# Patient Record
Sex: FEMALE
[De-identification: names, ages, dates, MRNs, and addresses within clinical notes are randomized per-mention and may not be internally consistent; named-entity substitution may affect disease eponyms.]

---

## 2021-08-06 ENCOUNTER — TRANSCRIPTION ENCOUNTER (OUTPATIENT)
Age: 56
End: 2021-08-06

## 2021-08-23 ENCOUNTER — INPATIENT (INPATIENT)
Facility: HOSPITAL | Age: 56
LOS: 2 days | Discharge: HOME | DRG: 378 | End: 2021-08-26
Attending: HOSPITALIST | Admitting: HOSPITALIST
Payer: COMMERCIAL

## 2021-08-23 VITALS
TEMPERATURE: 98 F | DIASTOLIC BLOOD PRESSURE: 70 MMHG | SYSTOLIC BLOOD PRESSURE: 140 MMHG | OXYGEN SATURATION: 97 % | RESPIRATION RATE: 18 BRPM | HEART RATE: 114 BPM

## 2021-08-23 DIAGNOSIS — Z90.49 ACQUIRED ABSENCE OF OTHER SPECIFIED PARTS OF DIGESTIVE TRACT: Chronic | ICD-10-CM

## 2021-08-23 DIAGNOSIS — K92.2 GASTROINTESTINAL HEMORRHAGE, UNSPECIFIED: ICD-10-CM

## 2021-08-23 DIAGNOSIS — Z29.9 ENCOUNTER FOR PROPHYLACTIC MEASURES, UNSPECIFIED: ICD-10-CM

## 2021-08-23 DIAGNOSIS — F17.200 NICOTINE DEPENDENCE, UNSPECIFIED, UNCOMPLICATED: ICD-10-CM

## 2021-08-23 DIAGNOSIS — R65.10 SYSTEMIC INFLAMMATORY RESPONSE SYNDROME (SIRS) OF NON-INFECTIOUS ORIGIN WITHOUT ACUTE ORGAN DYSFUNCTION: ICD-10-CM

## 2021-08-23 DIAGNOSIS — I10 ESSENTIAL (PRIMARY) HYPERTENSION: ICD-10-CM

## 2021-08-23 DIAGNOSIS — K21.9 GASTRO-ESOPHAGEAL REFLUX DISEASE WITHOUT ESOPHAGITIS: ICD-10-CM

## 2021-08-23 DIAGNOSIS — E11.9 TYPE 2 DIABETES MELLITUS WITHOUT COMPLICATIONS: ICD-10-CM

## 2021-08-23 DIAGNOSIS — M19.90 UNSPECIFIED OSTEOARTHRITIS, UNSPECIFIED SITE: ICD-10-CM

## 2021-08-23 LAB
ALBUMIN SERPL ELPH-MCNC: 3.6 G/DL — SIGNIFICANT CHANGE UP (ref 3.5–5)
ALP SERPL-CCNC: 66 U/L — SIGNIFICANT CHANGE UP (ref 40–120)
ALT FLD-CCNC: 24 U/L DA — SIGNIFICANT CHANGE UP (ref 10–60)
ANION GAP SERPL CALC-SCNC: 7 MMOL/L — SIGNIFICANT CHANGE UP (ref 5–17)
APTT BLD: 30.4 SEC — SIGNIFICANT CHANGE UP (ref 27.5–35.5)
AST SERPL-CCNC: 14 U/L — SIGNIFICANT CHANGE UP (ref 10–40)
BASOPHILS # BLD AUTO: 0 K/UL — SIGNIFICANT CHANGE UP (ref 0–0.2)
BASOPHILS NFR BLD AUTO: 0 % — SIGNIFICANT CHANGE UP (ref 0–2)
BILIRUB SERPL-MCNC: 0.3 MG/DL — SIGNIFICANT CHANGE UP (ref 0.2–1.2)
BUN SERPL-MCNC: 52 MG/DL — HIGH (ref 7–18)
CALCIUM SERPL-MCNC: 8.6 MG/DL — SIGNIFICANT CHANGE UP (ref 8.4–10.5)
CHLORIDE SERPL-SCNC: 102 MMOL/L — SIGNIFICANT CHANGE UP (ref 96–108)
CK SERPL-CCNC: 39 U/L — SIGNIFICANT CHANGE UP (ref 21–215)
CO2 SERPL-SCNC: 26 MMOL/L — SIGNIFICANT CHANGE UP (ref 22–31)
CREAT SERPL-MCNC: 0.84 MG/DL — SIGNIFICANT CHANGE UP (ref 0.5–1.3)
EOSINOPHIL # BLD AUTO: 0 K/UL — SIGNIFICANT CHANGE UP (ref 0–0.5)
EOSINOPHIL NFR BLD AUTO: 0 % — SIGNIFICANT CHANGE UP (ref 0–6)
GLUCOSE SERPL-MCNC: 244 MG/DL — HIGH (ref 70–99)
HCT VFR BLD CALC: 20.8 % — CRITICAL LOW (ref 34.5–45)
HGB BLD-MCNC: 7.1 G/DL — LOW (ref 11.5–15.5)
INR BLD: 1.08 RATIO — SIGNIFICANT CHANGE UP (ref 0.88–1.16)
LIDOCAIN IGE QN: 180 U/L — SIGNIFICANT CHANGE UP (ref 73–393)
LYMPHOCYTES # BLD AUTO: 14 % — SIGNIFICANT CHANGE UP (ref 13–44)
LYMPHOCYTES # BLD AUTO: 2.87 K/UL — SIGNIFICANT CHANGE UP (ref 1–3.3)
MCHC RBC-ENTMCNC: 32 PG — SIGNIFICANT CHANGE UP (ref 27–34)
MCHC RBC-ENTMCNC: 34.1 GM/DL — SIGNIFICANT CHANGE UP (ref 32–36)
MCV RBC AUTO: 93.7 FL — SIGNIFICANT CHANGE UP (ref 80–100)
MONOCYTES # BLD AUTO: 0.62 K/UL — SIGNIFICANT CHANGE UP (ref 0–0.9)
MONOCYTES NFR BLD AUTO: 3 % — SIGNIFICANT CHANGE UP (ref 2–14)
NEUTROPHILS # BLD AUTO: 16.41 K/UL — HIGH (ref 1.8–7.4)
NEUTROPHILS NFR BLD AUTO: 80 % — HIGH (ref 43–77)
OB PNL STL: POSITIVE
PLATELET # BLD AUTO: 441 K/UL — HIGH (ref 150–400)
POTASSIUM SERPL-MCNC: 3.9 MMOL/L — SIGNIFICANT CHANGE UP (ref 3.5–5.3)
POTASSIUM SERPL-SCNC: 3.9 MMOL/L — SIGNIFICANT CHANGE UP (ref 3.5–5.3)
PROT SERPL-MCNC: 6.5 G/DL — SIGNIFICANT CHANGE UP (ref 6–8.3)
PROTHROM AB SERPL-ACNC: 12.8 SEC — SIGNIFICANT CHANGE UP (ref 10.6–13.6)
RBC # BLD: 2.22 M/UL — LOW (ref 3.8–5.2)
RBC # FLD: 13 % — SIGNIFICANT CHANGE UP (ref 10.3–14.5)
SARS-COV-2 RNA SPEC QL NAA+PROBE: SIGNIFICANT CHANGE UP
SODIUM SERPL-SCNC: 135 MMOL/L — SIGNIFICANT CHANGE UP (ref 135–145)
TROPONIN I SERPL-MCNC: 0.04 NG/ML — SIGNIFICANT CHANGE UP (ref 0–0.04)
WBC # BLD: 20.51 K/UL — HIGH (ref 3.8–10.5)
WBC # FLD AUTO: 20.51 K/UL — HIGH (ref 3.8–10.5)

## 2021-08-23 PROCEDURE — 99291 CRITICAL CARE FIRST HOUR: CPT | Mod: CS

## 2021-08-23 PROCEDURE — 99223 1ST HOSP IP/OBS HIGH 75: CPT | Mod: GC

## 2021-08-23 PROCEDURE — 93010 ELECTROCARDIOGRAM REPORT: CPT

## 2021-08-23 PROCEDURE — 71045 X-RAY EXAM CHEST 1 VIEW: CPT | Mod: 26

## 2021-08-23 PROCEDURE — 74174 CTA ABD&PLVS W/CONTRAST: CPT | Mod: 26

## 2021-08-23 RX ORDER — SODIUM CHLORIDE 9 MG/ML
1000 INJECTION INTRAMUSCULAR; INTRAVENOUS; SUBCUTANEOUS ONCE
Refills: 0 | Status: COMPLETED | OUTPATIENT
Start: 2021-08-23 | End: 2021-08-23

## 2021-08-23 RX ORDER — PANTOPRAZOLE SODIUM 20 MG/1
80 TABLET, DELAYED RELEASE ORAL ONCE
Refills: 0 | Status: COMPLETED | OUTPATIENT
Start: 2021-08-23 | End: 2021-08-23

## 2021-08-23 RX ORDER — SODIUM CHLORIDE 9 MG/ML
1000 INJECTION, SOLUTION INTRAVENOUS
Refills: 0 | Status: DISCONTINUED | OUTPATIENT
Start: 2021-08-23 | End: 2021-08-24

## 2021-08-23 RX ORDER — NICOTINE POLACRILEX 2 MG
1 GUM BUCCAL DAILY
Refills: 0 | Status: DISCONTINUED | OUTPATIENT
Start: 2021-08-23 | End: 2021-08-26

## 2021-08-23 RX ORDER — PANTOPRAZOLE SODIUM 20 MG/1
8 TABLET, DELAYED RELEASE ORAL
Qty: 80 | Refills: 0 | Status: DISCONTINUED | OUTPATIENT
Start: 2021-08-23 | End: 2021-08-24

## 2021-08-23 RX ORDER — INSULIN LISPRO 100/ML
VIAL (ML) SUBCUTANEOUS EVERY 6 HOURS
Refills: 0 | Status: DISCONTINUED | OUTPATIENT
Start: 2021-08-23 | End: 2021-08-26

## 2021-08-23 RX ADMIN — SODIUM CHLORIDE 1000 MILLILITER(S): 9 INJECTION INTRAMUSCULAR; INTRAVENOUS; SUBCUTANEOUS at 16:02

## 2021-08-23 RX ADMIN — PANTOPRAZOLE SODIUM 80 MILLIGRAM(S): 20 TABLET, DELAYED RELEASE ORAL at 17:15

## 2021-08-23 RX ADMIN — PANTOPRAZOLE SODIUM 10 MG/HR: 20 TABLET, DELAYED RELEASE ORAL at 18:21

## 2021-08-23 NOTE — H&P ADULT - NSICDXPASTMEDICALHX_GEN_ALL_CORE_FT
PAST MEDICAL HISTORY:  Diabetes mellitus     GERD (gastroesophageal reflux disease)     HTN (hypertension)     OA (osteoarthritis)

## 2021-08-23 NOTE — H&P ADULT - ATTENDING COMMENTS
55 year old F with OA, DM, GERD admitted for hematemesis and melena likely 2/2 PUD. Hgb 7.1 on admission, written for 1 unit PRBC and 1L fluid bolus. Guiac positive. Had aspirin recently was also given 1 unit platelet in the ED. CT angio pending.    Vital Signs Last 24 Hrs  T(C): 37.1 (23 Aug 2021 19:32), Max: 37.1 (23 Aug 2021 17:54)  T(F): 98.7 (23 Aug 2021 19:32), Max: 98.8 (23 Aug 2021 17:54)  HR: 113 (23 Aug 2021 19:32) (113 - 118)  BP: 108/70 (23 Aug 2021 19:32) (108/70 - 157/92)  BP(mean): --  RR: 18 (23 Aug 2021 19:32) (18 - 18)  SpO2: 96% (23 Aug 2021 19:32) (96% - 99%)                          7.1    20.51 )-----------( 441      ( 23 Aug 2021 16:32 )             20.8   08-23    135  |  102  |  52<H>  ----------------------------<  244<H>  3.9   |  26  |  0.84    Ca    8.6      23 Aug 2021 16:32    TPro  6.5  /  Alb  3.6  /  TBili  0.3  /  DBili  x   /  AST  14  /  ALT  24  /  AlkPhos  66  08-23    A/P  #Upper GI bleed - patient with melena and hematemesis c/f PUD. Patient has hx of NSAID use for OA. Hgb 7.1. Getting 1 unit PRBC. Plan for protonix gtt, CBC q6hr, transfuse for Hgb < 8 for active bleeding, continue IVF, GI consulted for EGD tmrw  #SIRS (tachycardic, leukocytosis) in the setting of GI bleed  #DM continue sliding scale  #GERD

## 2021-08-23 NOTE — H&P ADULT - HISTORY OF PRESENT ILLNESS
56 yo F from home with Pmhx of HTN, DM, OA, GERD, on aspirin for 18 months, came for chief complains of hematemesis and melena. Pt states that she started noticing black stools since yesterday. So far she has 7 black bowel movements, moderate in amount, soft in consistency. Last BM 3 hours ago. She had one episode of bloody vomiting in morning. She noticed few clots , small in size, moderate amount, red- maroon in color , no food particles. PT endorses to have nausea and loss of appetite for 2 days. She has tenesmus, fatigue and weakness but denies fevers, shortness of breadth, iron pills, weight loss, colonoscopy and endoscopy in the past, spicy food intake, abdominal pain.   Pt further mentioned that she is using baking soda and also having alot of red bull drink recently.

## 2021-08-23 NOTE — ED PROVIDER NOTE - CARE PLAN
1 Principal Discharge DX:	Upper GI bleed  Secondary Diagnosis:	Anemia due to acute blood loss  Secondary Diagnosis:	Sinus tachycardia

## 2021-08-23 NOTE — H&P ADULT - PROBLEM SELECTOR PLAN 3
-SBP is 140-160  -Will hold off HTN med in the setting of GI bleed   Primary team to confirm medications in the morning. Pharmacy closed at 7pm.

## 2021-08-23 NOTE — ED PROVIDER NOTE - OBJECTIVE STATEMENT
55 y.o female with a significant history of HTN (on Aspirin) and cholecystectomy presents to the ED complaining of lightheadedness and low blood pressure yesterday. Patient also notes having 3 episodes of black stool yesterday and 1 episode of black vomiting today. Patient did not take blood pressure meds yesterday or today because she spoke with her doctor who urges her to not take any meds, due to low blood pressure, and come to the ED. Patient denies pain or any other complaints. NILODA. 55 y.o female with a significant history of HTN (on Aspirin) and cholecystectomy presents to the ED complaining of lightheadedness and low blood pressure yesterday. Patient also notes having 3 episodes of black stool yesterday and 1 episode of black vomiting today. Patient did not take blood pressure meds yesterday or today because she spoke with her doctor who urged her to not take any meds, due to low blood pressure, and come to the ED. Patient denies pain or any other complaints. NILODA.

## 2021-08-23 NOTE — ED ADULT NURSE NOTE - OBJECTIVE STATEMENT
Patient presents to ED with c/o  nausea and vomiting x 3 days patient reports blood in vomit and in urine. Denies pain, c/o weakness. denies fall

## 2021-08-23 NOTE — ED PROVIDER NOTE - CLINICAL SUMMARY MEDICAL DECISION MAKING FREE TEXT BOX
Patient with suspicious for GI bleed. Will gets labs and reassess. Patient with suspicious for GI bleed. Will gets labs and reassess.   labs reveal low h/h, black stool on rectal evaluation. noted tachycardia and ichemic EKG changes on EKG. admit for blood transfusion (active bleeding) and urgent GI consult (will call from ED)

## 2021-08-23 NOTE — H&P ADULT - NSHPPHYSICALEXAM_GEN_ALL_CORE
Vital Signs Last 24 Hrs  T(C): 37.1 (23 Aug 2021 17:54), Max: 37.1 (23 Aug 2021 17:54)  T(F): 98.8 (23 Aug 2021 17:54), Max: 98.8 (23 Aug 2021 17:54)  HR: 118 (23 Aug 2021 17:54) (113 - 118)  BP: 157/92 (23 Aug 2021 17:54) (121/72 - 157/92)  BP(mean): --  RR: 18 (23 Aug 2021 17:54) (18 - 18)  SpO2: 98% (23 Aug 2021 17:54) (97% - 99%)    GENERAL: NAD, apperas comfortable   EYES: EOMI, PERRLA,   NECK: Supple, No JVD  CHEST/LUNG: Clear to auscultation b/l  No rales, rhonchi, wheezing   HEART: Regular rate and rhythm; No murmurs, +ve S1 S2 , tachycardia +ve   ABDOMEN: Soft, mild tender in epigastric region, Nondistended; Bowel sounds hyperactive   NERVOUS SYSTEM:  Alert & Oriented X3, normal sensations and normal strength     EXTREMITIES:   No clubbing, cyanosis, or edema

## 2021-08-23 NOTE — H&P ADULT - PROBLEM SELECTOR PLAN 2
-Pt with elevated HR >100 and WBC 20K  -Less likely infection but will do sepsis work up  -No need of abx  now   -Will send UA, Bcx, Chest Xray is normal (f/u official read)  -EKG with T wave inversion in V5 and AVL but no chest pain , SOB  -Trop borderline normal

## 2021-08-23 NOTE — H&P ADULT - ASSESSMENT
54 yo F with hx of HTN, DM, OA, GERD came for chief complain fo hematemesis and melena . Admitted for upper GI bleed     Primary team to confirm medications in the morning. Pharmacy closed at 7pm. Pt and  don't remember the medications

## 2021-08-23 NOTE — H&P ADULT - PROBLEM SELECTOR PLAN 1
-c/w hematemesis and melena   -DDx: PUD vs Aspirin Use  -2 IV bore cannula   -1 lit of bolus NS in ED   -Going to get 1PRBC and 1 Plt STAT  -CT Angio A/P with IV Contrast   -Standing fluids 100cc/hr LR  -CBC q6h   -If pt continues to bleed, unstable  , likely go for urgent Endoscopy but for now EGD tomorrow  -NPO   -GI consulted Dr. Antonio -c/w hematemesis and melena   -DDx: PUD vs Aspirin Use  -2 IV bore cannula   -1 lit of bolus NS in ED   -Going to get 1PRBC and 1 Plt STAT  -CT Angio A/P with IV Contrast   -Standing fluids 100cc/hr LR  -CBC q6h   -If pt continues to bleed, unstable  , likely go for urgent Endoscopy but for now EGD tomorrow  -NPO   -Protonix drip for 48-72 hour  -GI consulted Dr. Antonio

## 2021-08-24 DIAGNOSIS — E87.6 HYPOKALEMIA: ICD-10-CM

## 2021-08-24 LAB
A1C WITH ESTIMATED AVERAGE GLUCOSE RESULT: 7.2 % — HIGH (ref 4–5.6)
ALBUMIN SERPL ELPH-MCNC: 3.5 G/DL — SIGNIFICANT CHANGE UP (ref 3.5–5)
ALP SERPL-CCNC: 61 U/L — SIGNIFICANT CHANGE UP (ref 40–120)
ALT FLD-CCNC: 23 U/L DA — SIGNIFICANT CHANGE UP (ref 10–60)
ANION GAP SERPL CALC-SCNC: 6 MMOL/L — SIGNIFICANT CHANGE UP (ref 5–17)
APPEARANCE UR: CLEAR — SIGNIFICANT CHANGE UP
AST SERPL-CCNC: 13 U/L — SIGNIFICANT CHANGE UP (ref 10–40)
BACTERIA # UR AUTO: ABNORMAL /HPF
BILIRUB SERPL-MCNC: 0.3 MG/DL — SIGNIFICANT CHANGE UP (ref 0.2–1.2)
BILIRUB UR-MCNC: NEGATIVE — SIGNIFICANT CHANGE UP
BUN SERPL-MCNC: 30 MG/DL — HIGH (ref 7–18)
CALCIUM SERPL-MCNC: 8.7 MG/DL — SIGNIFICANT CHANGE UP (ref 8.4–10.5)
CHLORIDE SERPL-SCNC: 107 MMOL/L — SIGNIFICANT CHANGE UP (ref 96–108)
CHOLEST SERPL-MCNC: 174 MG/DL — SIGNIFICANT CHANGE UP
CO2 SERPL-SCNC: 27 MMOL/L — SIGNIFICANT CHANGE UP (ref 22–31)
COLOR SPEC: YELLOW — SIGNIFICANT CHANGE UP
COVID-19 SPIKE DOMAIN AB INTERP: POSITIVE
COVID-19 SPIKE DOMAIN ANTIBODY RESULT: 31.7 U/ML — HIGH
CREAT SERPL-MCNC: 0.67 MG/DL — SIGNIFICANT CHANGE UP (ref 0.5–1.3)
DIFF PNL FLD: NEGATIVE — SIGNIFICANT CHANGE UP
EPI CELLS # UR: SIGNIFICANT CHANGE UP /HPF
ESTIMATED AVERAGE GLUCOSE: 160 MG/DL — HIGH (ref 68–114)
GLUCOSE BLDC GLUCOMTR-MCNC: 149 MG/DL — HIGH (ref 70–99)
GLUCOSE BLDC GLUCOMTR-MCNC: 171 MG/DL — HIGH (ref 70–99)
GLUCOSE BLDC GLUCOMTR-MCNC: 183 MG/DL — HIGH (ref 70–99)
GLUCOSE BLDC GLUCOMTR-MCNC: 192 MG/DL — HIGH (ref 70–99)
GLUCOSE SERPL-MCNC: 139 MG/DL — HIGH (ref 70–99)
GLUCOSE UR QL: NEGATIVE — SIGNIFICANT CHANGE UP
HCT VFR BLD CALC: 19.4 % — CRITICAL LOW (ref 34.5–45)
HCT VFR BLD CALC: 21.2 % — LOW (ref 34.5–45)
HCT VFR BLD CALC: 22.1 % — LOW (ref 34.5–45)
HCT VFR BLD CALC: 22.6 % — LOW (ref 34.5–45)
HDLC SERPL-MCNC: 30 MG/DL — LOW
HGB BLD-MCNC: 6.7 G/DL — CRITICAL LOW (ref 11.5–15.5)
HGB BLD-MCNC: 7.2 G/DL — LOW (ref 11.5–15.5)
HGB BLD-MCNC: 7.7 G/DL — LOW (ref 11.5–15.5)
HGB BLD-MCNC: 7.8 G/DL — LOW (ref 11.5–15.5)
KETONES UR-MCNC: NEGATIVE — SIGNIFICANT CHANGE UP
LEUKOCYTE ESTERASE UR-ACNC: NEGATIVE — SIGNIFICANT CHANGE UP
LIPID PNL WITH DIRECT LDL SERPL: 72 MG/DL — SIGNIFICANT CHANGE UP
MAGNESIUM SERPL-MCNC: 2.2 MG/DL — SIGNIFICANT CHANGE UP (ref 1.6–2.6)
MCHC RBC-ENTMCNC: 31.3 PG — SIGNIFICANT CHANGE UP (ref 27–34)
MCHC RBC-ENTMCNC: 31.6 PG — SIGNIFICANT CHANGE UP (ref 27–34)
MCHC RBC-ENTMCNC: 31.8 PG — SIGNIFICANT CHANGE UP (ref 27–34)
MCHC RBC-ENTMCNC: 31.8 PG — SIGNIFICANT CHANGE UP (ref 27–34)
MCHC RBC-ENTMCNC: 34 GM/DL — SIGNIFICANT CHANGE UP (ref 32–36)
MCHC RBC-ENTMCNC: 34.5 GM/DL — SIGNIFICANT CHANGE UP (ref 32–36)
MCHC RBC-ENTMCNC: 34.5 GM/DL — SIGNIFICANT CHANGE UP (ref 32–36)
MCHC RBC-ENTMCNC: 34.8 GM/DL — SIGNIFICANT CHANGE UP (ref 32–36)
MCV RBC AUTO: 91.3 FL — SIGNIFICANT CHANGE UP (ref 80–100)
MCV RBC AUTO: 91.5 FL — SIGNIFICANT CHANGE UP (ref 80–100)
MCV RBC AUTO: 91.9 FL — SIGNIFICANT CHANGE UP (ref 80–100)
MCV RBC AUTO: 92.2 FL — SIGNIFICANT CHANGE UP (ref 80–100)
NITRITE UR-MCNC: NEGATIVE — SIGNIFICANT CHANGE UP
NON HDL CHOLESTEROL: 144 MG/DL — HIGH
NRBC # BLD: 0 /100 WBCS — SIGNIFICANT CHANGE UP (ref 0–0)
PH UR: 6 — SIGNIFICANT CHANGE UP (ref 5–8)
PHOSPHATE SERPL-MCNC: 2 MG/DL — LOW (ref 2.5–4.5)
PLATELET # BLD AUTO: 383 K/UL — SIGNIFICANT CHANGE UP (ref 150–400)
PLATELET # BLD AUTO: 386 K/UL — SIGNIFICANT CHANGE UP (ref 150–400)
PLATELET # BLD AUTO: 395 K/UL — SIGNIFICANT CHANGE UP (ref 150–400)
PLATELET # BLD AUTO: 402 K/UL — HIGH (ref 150–400)
POTASSIUM SERPL-MCNC: 3.2 MMOL/L — LOW (ref 3.5–5.3)
POTASSIUM SERPL-SCNC: 3.2 MMOL/L — LOW (ref 3.5–5.3)
PROT SERPL-MCNC: 6.5 G/DL — SIGNIFICANT CHANGE UP (ref 6–8.3)
PROT UR-MCNC: 15
RBC # BLD: 2.11 M/UL — LOW (ref 3.8–5.2)
RBC # BLD: 2.3 M/UL — LOW (ref 3.8–5.2)
RBC # BLD: 2.42 M/UL — LOW (ref 3.8–5.2)
RBC # BLD: 2.47 M/UL — LOW (ref 3.8–5.2)
RBC # FLD: 13.1 % — SIGNIFICANT CHANGE UP (ref 10.3–14.5)
RBC # FLD: 13.4 % — SIGNIFICANT CHANGE UP (ref 10.3–14.5)
RBC # FLD: 13.6 % — SIGNIFICANT CHANGE UP (ref 10.3–14.5)
RBC # FLD: 13.8 % — SIGNIFICANT CHANGE UP (ref 10.3–14.5)
RBC CASTS # UR COMP ASSIST: NEGATIVE /HPF — SIGNIFICANT CHANGE UP (ref 0–2)
SARS-COV-2 IGG+IGM SERPL QL IA: 31.7 U/ML — HIGH
SARS-COV-2 IGG+IGM SERPL QL IA: POSITIVE
SODIUM SERPL-SCNC: 140 MMOL/L — SIGNIFICANT CHANGE UP (ref 135–145)
SP GR SPEC: 1.01 — SIGNIFICANT CHANGE UP (ref 1.01–1.02)
TRIGL SERPL-MCNC: 359 MG/DL — HIGH
UROBILINOGEN FLD QL: NEGATIVE — SIGNIFICANT CHANGE UP
WBC # BLD: 16.49 K/UL — HIGH (ref 3.8–10.5)
WBC # BLD: 17.3 K/UL — HIGH (ref 3.8–10.5)
WBC # BLD: 19.3 K/UL — HIGH (ref 3.8–10.5)
WBC # BLD: 24.29 K/UL — HIGH (ref 3.8–10.5)
WBC # FLD AUTO: 16.49 K/UL — HIGH (ref 3.8–10.5)
WBC # FLD AUTO: 17.3 K/UL — HIGH (ref 3.8–10.5)
WBC # FLD AUTO: 19.3 K/UL — HIGH (ref 3.8–10.5)
WBC # FLD AUTO: 24.29 K/UL — HIGH (ref 3.8–10.5)
WBC UR QL: SIGNIFICANT CHANGE UP /HPF (ref 0–5)

## 2021-08-24 PROCEDURE — 99222 1ST HOSP IP/OBS MODERATE 55: CPT | Mod: 25

## 2021-08-24 PROCEDURE — 99233 SBSQ HOSP IP/OBS HIGH 50: CPT | Mod: GC

## 2021-08-24 PROCEDURE — 43255 EGD CONTROL BLEEDING ANY: CPT

## 2021-08-24 PROCEDURE — 99232 SBSQ HOSP IP/OBS MODERATE 35: CPT | Mod: 25

## 2021-08-24 RX ORDER — SODIUM CHLORIDE 9 MG/ML
1000 INJECTION, SOLUTION INTRAVENOUS
Refills: 0 | Status: DISCONTINUED | OUTPATIENT
Start: 2021-08-24 | End: 2021-08-26

## 2021-08-24 RX ORDER — ESCITALOPRAM OXALATE 10 MG/1
10 TABLET, FILM COATED ORAL DAILY
Refills: 0 | Status: DISCONTINUED | OUTPATIENT
Start: 2021-08-24 | End: 2021-08-26

## 2021-08-24 RX ORDER — BUDESONIDE AND FORMOTEROL FUMARATE DIHYDRATE 160; 4.5 UG/1; UG/1
2 AEROSOL RESPIRATORY (INHALATION)
Refills: 0 | Status: DISCONTINUED | OUTPATIENT
Start: 2021-08-24 | End: 2021-08-26

## 2021-08-24 RX ORDER — GEMFIBROZIL 600 MG
600 TABLET ORAL
Refills: 0 | Status: DISCONTINUED | OUTPATIENT
Start: 2021-08-24 | End: 2021-08-26

## 2021-08-24 RX ORDER — POTASSIUM CHLORIDE 20 MEQ
10 PACKET (EA) ORAL
Refills: 0 | Status: COMPLETED | OUTPATIENT
Start: 2021-08-24 | End: 2021-08-24

## 2021-08-24 RX ORDER — PANTOPRAZOLE SODIUM 20 MG/1
40 TABLET, DELAYED RELEASE ORAL EVERY 12 HOURS
Refills: 0 | Status: DISCONTINUED | OUTPATIENT
Start: 2021-08-24 | End: 2021-08-26

## 2021-08-24 RX ORDER — POTASSIUM PHOSPHATE, MONOBASIC POTASSIUM PHOSPHATE, DIBASIC 236; 224 MG/ML; MG/ML
15 INJECTION, SOLUTION INTRAVENOUS ONCE
Refills: 0 | Status: COMPLETED | OUTPATIENT
Start: 2021-08-24 | End: 2021-08-24

## 2021-08-24 RX ORDER — GABAPENTIN 400 MG/1
100 CAPSULE ORAL
Refills: 0 | Status: DISCONTINUED | OUTPATIENT
Start: 2021-08-24 | End: 2021-08-26

## 2021-08-24 RX ADMIN — Medication 100 MILLIEQUIVALENT(S): at 16:00

## 2021-08-24 RX ADMIN — POTASSIUM PHOSPHATE, MONOBASIC POTASSIUM PHOSPHATE, DIBASIC 62.5 MILLIMOLE(S): 236; 224 INJECTION, SOLUTION INTRAVENOUS at 17:35

## 2021-08-24 RX ADMIN — Medication 100 MILLIEQUIVALENT(S): at 17:15

## 2021-08-24 RX ADMIN — BUDESONIDE AND FORMOTEROL FUMARATE DIHYDRATE 2 PUFF(S): 160; 4.5 AEROSOL RESPIRATORY (INHALATION) at 21:25

## 2021-08-24 RX ADMIN — PANTOPRAZOLE SODIUM 10 MG/HR: 20 TABLET, DELAYED RELEASE ORAL at 05:45

## 2021-08-24 RX ADMIN — Medication 100 MILLIEQUIVALENT(S): at 15:00

## 2021-08-24 RX ADMIN — SODIUM CHLORIDE 100 MILLILITER(S): 9 INJECTION, SOLUTION INTRAVENOUS at 05:45

## 2021-08-24 RX ADMIN — GABAPENTIN 100 MILLIGRAM(S): 400 CAPSULE ORAL at 17:35

## 2021-08-24 RX ADMIN — PANTOPRAZOLE SODIUM 40 MILLIGRAM(S): 20 TABLET, DELAYED RELEASE ORAL at 17:35

## 2021-08-24 RX ADMIN — Medication 600 MILLIGRAM(S): at 17:35

## 2021-08-24 NOTE — PROGRESS NOTE ADULT - ATTENDING COMMENTS
Patient seen and examined.  Case discussed with house staff.  Agree with above as edited.   Patient interviewed with [place] , ID # [id number]  Patient is a 55yoF with h/o HTN, DM II, OA, GERD a/w acute blood loss anemia 2/2 Upper GI bleed.  Acute blood loss anemia 2/2 Upper GI bleed - Being transfused pRBCs. Monitor h/h. GI eval appreciated. NPO. Plan for EGD today. Suspect PUD secondary to ASA use > gastritis, esophagitis, AVM. c/w PPI. Holding ASA  SIRS - likely noninfectious SIRS in setting of GI bleed. No fever. no s/s of infection. Cxs have been drawn. Will f/u results  HTN - holding home antihypertensives in setting of GI bleed. Resume as tolerated  DM - holding oral hypoglycemics. ISS with FS monitoring.  Hypokalemia - Replete with KCl and monitor.   Hypophosphatemia - Replete with KPhos and monitor.   Plan d/w Patient  Case d/w GI Attending Patient seen and examined.  Case discussed with house staff.  Agree with above as edited.   Patient interviewed with [place] , ID # [id number]  Patient is a 55yoF with h/o HTN, DM II, OA, GERD a/w acute blood loss anemia 2/2 Upper GI bleed.  Acute blood loss anemia 2/2 Upper GI bleed - Being transfused pRBCs. Monitor h/h. GI eval appreciated. NPO. Plan for EGD today. Suspect PUD secondary to ASA use > gastritis, esophagitis, AVM. c/w PPI. Holding ASA  SIRS - likely noninfectious SIRS in setting of GI bleed. No fever. no s/s of infection. Cxs have been drawn. Will f/u results  HTN - holding home antihypertensives in setting of GI bleed. Resume as tolerated  DM - holding oral hypoglycemics. ISS with FS monitoring.  Hypokalemia - Replete with KCl and monitor.   Hypophosphatemia - Replete with KPhos and monitor.   Plan d/w Patient  Case d/w GI Attending    8/24/21 15:25 UPDATE:  Spoke to patient's PMD. Patient has been on ASA for primary prophylaxis. Also, in addition to above, has h/o COPD and is on Symbicort. Will continue to hold ASA. Will resume symbicort.

## 2021-08-24 NOTE — CONSULT NOTE ADULT - ASSESSMENT
54 yo F with hx of HTN, DM, OA (reports using Advil), GERD and +tobacco user admitted for upper GI bleed

## 2021-08-24 NOTE — CONSULT NOTE ADULT - SUBJECTIVE AND OBJECTIVE BOX
[facility]    Patient is a 55y old  Female who presents with a chief complaint of Upper GI bleed (23 Aug 2021 18:05)    HPI:  56 yo F from home with Pmhx of HTN, DM, OA, GERD, on aspirin for 18 months, came for chief complains of hematemesis and melena. Pt states that she started noticing black stools since yesterday. So far she has 7 black bowel movements, moderate in amount, soft in consistency. Last BM 3 hours ago. She had one episode of bloody vomiting in morning. She noticed few clots , small in size, moderate amount, red- maroon in color , no food particles. PT endorses to have nausea and loss of appetite for 2 days. She has tenesmus, fatigue and weakness but denies fevers, shortness of breadth, iron pills, weight loss, colonoscopy and endoscopy in the past, spicy food intake, abdominal pain.   Pt further mentioned that she is using baking soda and also having alot of red bull drink recently.  (23 Aug 2021 18:05)    PMH/PSH:  PAST MEDICAL & SURGICAL HISTORY:  HTN (hypertension)    Diabetes mellitus    OA (osteoarthritis)    GERD (gastroesophageal reflux disease)    S/P cholecystectomy      FH:  FAMILY HISTORY:      MEDS:  MEDICATIONS  (STANDING):  insulin lispro (ADMELOG) corrective regimen sliding scale   SubCutaneous every 6 hours  lactated ringers. 1000 milliLiter(s) (100 mL/Hr) IV Continuous <Continuous>  nicotine -  14 mG/24Hr(s) Patch 1 patch Transdermal daily  pantoprazole Infusion 8 mG/Hr (10 mL/Hr) IV Continuous <Continuous>    MEDICATIONS  (PRN):    Allergies    No Known Allergies    Intolerances            CONSTITUTIONAL:  No weight loss, fever, chills, weakness or fatigue.  HEENT:  Eyes:  No visual loss, blurred vision, double vision or yellow sclerae. Ears, Nose, Throat:  No hearing loss, sneezing, congestion, runny nose or sore throat.  SKIN:  No rash or itching.  CARDIOVASCULAR:  No chest pain, chest pressure or chest discomfort. No palpitations or edema.  RESPIRATORY:  No shortness of breath, cough or sputum.  GASTROINTESTINAL:  SEE HPI  GENITOURINARY:  No dysuria, hematuria, urinary frequency  NEUROLOGICAL:  No headache, dizziness, syncope, paralysis, ataxia, numbness or tingling in the extremities. No change in bowel or bladder control.  MUSCULOSKELETAL:  No muscle, back pain, joint pain or stiffness.  HEMATOLOGIC:  No anemia, bleeding or bruising.  LYMPHATICS:  No enlarged nodes. No history of splenectomy.  PSYCHIATRIC:  No history of depression or anxiety.  ENDOCRINOLOGIC:  No reports of sweating, cold or heat intolerance. No polyuria or polydipsia.      ______________________________________________________________________  PHYSICAL EXAM:  T(C): 36.4 (08-24-21 @ 07:44), Max: 37.2 (08-23-21 @ 21:05)  HR: 100 (08-24-21 @ 07:44)  BP: 130/71 (08-24-21 @ 07:44)  RR: 18 (08-24-21 @ 07:44)  SpO2: 99% (08-24-21 @ 07:44)  Wt(kg): --      GEN: NAD, normocephalic  CVS: S1S2+  CHEST: clear to auscultation  ABD: soft , nontender, nondistended, bowel sounds present  EXTR: no cyanosis, no clubbing, no edema  NEURO: Awake and alert; oriented to person, place, time   SKIN:  warm;  non icteric    ______________________________________________________________________  LABS:                        6.7    19.30 )-----------( 383      ( 24 Aug 2021 06:56 )             19.4     08-24    140  |  107  |  30<H>  ----------------------------<  139<H>  3.2<L>   |  27  |  0.67    Ca    8.7      24 Aug 2021 06:56  Phos  2.0     08-24  Mg     2.2     08-24    TPro  6.5  /  Alb  3.5  /  TBili  0.3  /  DBili  x   /  AST  13  /  ALT  23  /  AlkPhos  61  08-24    LIVER FUNCTIONS - ( 24 Aug 2021 06:56 )  Alb: 3.5 g/dL / Pro: 6.5 g/dL / ALK PHOS: 61 U/L / ALT: 23 U/L DA / AST: 13 U/L / GGT: x           PT/INR - ( 23 Aug 2021 16:32 )   PT: 12.8 sec;   INR: 1.08 ratio         PTT - ( 23 Aug 2021 16:32 )  PTT:30.4 sec  ____________________________________________    IMAGING: CTA showing no signs of GI Hemorrhage          IN GI CONSULTATION    Patient is a 55y old  Female who presents with a chief complaint of Upper GI bleed (23 Aug 2021 18:05)    HPI:  54 yo F from home with Pmhx of HTN, DM, OA, GERD, on aspirin for 18 months, came for chief complains of hematemesis and melena. Pt states that she started noticing black stools since yesterday. So far she has 7 black bowel movements, moderate in amount, soft in consistency. Last BM 3 hours ago. She had one episode of bloody vomiting in morning. She noticed few clots , small in size, moderate amount, red- maroon in color , no food particles. PT endorses to have nausea and loss of appetite for 2 days. She has tenesmus, fatigue and weakness but denies fevers, shortness of breadth, iron pills, weight loss, colonoscopy and endoscopy in the past, spicy food intake, abdominal pain.   Pt further mentioned that she is using baking soda and also having alot of red bull drink recently.  (23 Aug 2021 18:05)    PMH/PSH:  PAST MEDICAL & SURGICAL HISTORY:  HTN (hypertension)    Diabetes mellitus    OA (osteoarthritis)    GERD (gastroesophageal reflux disease)    S/P cholecystectomy      FH:  FAMILY HISTORY:      MEDS:  MEDICATIONS  (STANDING):  insulin lispro (ADMELOG) corrective regimen sliding scale   SubCutaneous every 6 hours  lactated ringers. 1000 milliLiter(s) (100 mL/Hr) IV Continuous <Continuous>  nicotine -  14 mG/24Hr(s) Patch 1 patch Transdermal daily  pantoprazole Infusion 8 mG/Hr (10 mL/Hr) IV Continuous <Continuous>    MEDICATIONS  (PRN):    Allergies    No Known Allergies    Intolerances            CONSTITUTIONAL:  No weight loss, fever, chills, weakness or fatigue.  HEENT:  Eyes:  No visual loss, blurred vision, double vision or yellow sclerae. Ears, Nose, Throat:  No hearing loss, sneezing, congestion, runny nose or sore throat.  SKIN:  No rash or itching.  CARDIOVASCULAR:  No chest pain, chest pressure or chest discomfort. No palpitations or edema.  RESPIRATORY:  No shortness of breath, cough or sputum.  GASTROINTESTINAL:  SEE HPI  GENITOURINARY:  No dysuria, hematuria, urinary frequency  NEUROLOGICAL:  No headache, dizziness, syncope, paralysis, ataxia, numbness or tingling in the extremities. No change in bowel or bladder control.  MUSCULOSKELETAL:  No muscle, back pain, joint pain or stiffness.  HEMATOLOGIC:  No anemia, bleeding or bruising.  LYMPHATICS:  No enlarged nodes. No history of splenectomy.  PSYCHIATRIC:  No history of depression or anxiety.  ENDOCRINOLOGIC:  No reports of sweating, cold or heat intolerance. No polyuria or polydipsia.    GI HPI  54 yo F with hx of HTN, DM, OA (reports using Advil), GERD and +tobacco user came for chief complain for hematemesis and melena. Admitted for upper GI bleed         ______________________________________________________________________  PHYSICAL EXAM:  T(C): 36.4 (08-24-21 @ 07:44), Max: 37.2 (08-23-21 @ 21:05)  HR: 100 (08-24-21 @ 07:44)  BP: 130/71 (08-24-21 @ 07:44)  RR: 18 (08-24-21 @ 07:44)  SpO2: 99% (08-24-21 @ 07:44)  Wt(kg): --      GEN: NAD, normocephalic  CVS: S1S2+  CHEST: clear to auscultation  ABD: soft , nontender, nondistended, bowel sounds present  EXTR: no cyanosis, no clubbing, no edema  NEURO: Awake and alert; oriented to person, place, time   SKIN:  warm;  non icteric    ______________________________________________________________________  LABS:                        6.7    19.30 )-----------( 383      ( 24 Aug 2021 06:56 )             19.4     08-24    140  |  107  |  30<H>  ----------------------------<  139<H>  3.2<L>   |  27  |  0.67    Ca    8.7      24 Aug 2021 06:56  Phos  2.0     08-24  Mg     2.2     08-24    TPro  6.5  /  Alb  3.5  /  TBili  0.3  /  DBili  x   /  AST  13  /  ALT  23  /  AlkPhos  61  08-24    LIVER FUNCTIONS - ( 24 Aug 2021 06:56 )  Alb: 3.5 g/dL / Pro: 6.5 g/dL / ALK PHOS: 61 U/L / ALT: 23 U/L DA / AST: 13 U/L / GGT: x           PT/INR - ( 23 Aug 2021 16:32 )   PT: 12.8 sec;   INR: 1.08 ratio         PTT - ( 23 Aug 2021 16:32 )  PTT:30.4 sec  ____________________________________________    IMAGING: CTA showing no signs of GI Hemorrhage          [facility]    Patient is a 55y old  Female who presents with a chief complaint of Upper GI bleed (23 Aug 2021 18:05)    HPI:  56 yo F from home with Pmhx of HTN, DM, OA, GERD, on aspirin for 18 months, came for chief complains of hematemesis and melena. Pt states that she started noticing black stools since yesterday. So far she has 7 black bowel movements, moderate in amount, soft in consistency. Last BM 3 hours ago. She had one episode of bloody vomiting in morning. She noticed few clots , small in size, moderate amount, red- maroon in color , no food particles. PT endorses to have nausea and loss of appetite for 2 days. She has tenesmus, fatigue and weakness but denies fevers, shortness of breadth, iron pills, weight loss, colonoscopy and endoscopy in the past, spicy food intake, abdominal pain.   Pt further mentioned that she is using baking soda and also having alot of red bull drink recently.  (23 Aug 2021 18:05)    PMH/PSH:  PAST MEDICAL & SURGICAL HISTORY:  HTN (hypertension)    Diabetes mellitus    OA (osteoarthritis)    GERD (gastroesophageal reflux disease)    S/P cholecystectomy      FH:  FAMILY HISTORY:      MEDS:  MEDICATIONS  (STANDING):  insulin lispro (ADMELOG) corrective regimen sliding scale   SubCutaneous every 6 hours  lactated ringers. 1000 milliLiter(s) (100 mL/Hr) IV Continuous <Continuous>  nicotine -  14 mG/24Hr(s) Patch 1 patch Transdermal daily  pantoprazole Infusion 8 mG/Hr (10 mL/Hr) IV Continuous <Continuous>    MEDICATIONS  (PRN):    Allergies    No Known Allergies    Intolerances            CONSTITUTIONAL:  No weight loss, fever, chills, weakness or fatigue.  HEENT:  Eyes:  No visual loss, blurred vision, double vision or yellow sclerae. Ears, Nose, Throat:  No hearing loss, sneezing, congestion, runny nose or sore throat.  SKIN:  No rash or itching.  CARDIOVASCULAR:  No chest pain, chest pressure or chest discomfort. No palpitations or edema.  RESPIRATORY:  No shortness of breath, cough or sputum.  GASTROINTESTINAL:  SEE HPI  GENITOURINARY:  No dysuria, hematuria, urinary frequency  NEUROLOGICAL:  No headache, dizziness, syncope, paralysis, ataxia, numbness or tingling in the extremities. No change in bowel or bladder control.  MUSCULOSKELETAL:  No muscle, back pain, joint pain or stiffness.  HEMATOLOGIC:  No anemia, bleeding or bruising.  LYMPHATICS:  No enlarged nodes. No history of splenectomy.  PSYCHIATRIC:  No history of depression or anxiety.  ENDOCRINOLOGIC:  No reports of sweating, cold or heat intolerance. No polyuria or polydipsia.    GI HPI  56 yo F with hx of HTN, DM, OA (reports using Advil), GERD and +tobacco user came for chief complaint of hematemesis and melena x1 day. Patient denies ever having an EGD/Colonoscopy. Admitted for upper GI bleed         ______________________________________________________________________  PHYSICAL EXAM:  T(C): 36.4 (08-24-21 @ 07:44), Max: 37.2 (08-23-21 @ 21:05)  HR: 100 (08-24-21 @ 07:44)  BP: 130/71 (08-24-21 @ 07:44)  RR: 18 (08-24-21 @ 07:44)  SpO2: 99% (08-24-21 @ 07:44)  Wt(kg): --      GEN: NAD, normocephalic  CVS: S1S2+  CHEST: clear to auscultation  ABD: soft , nontender, nondistended, bowel sounds present  EXTR: no cyanosis, no clubbing, no edema  NEURO: Awake and alert; oriented to person, place, time   SKIN:  warm;  non icteric    ______________________________________________________________________  LABS:                        6.7    19.30 )-----------( 383      ( 24 Aug 2021 06:56 )             19.4     08-24    140  |  107  |  30<H>  ----------------------------<  139<H>  3.2<L>   |  27  |  0.67    Ca    8.7      24 Aug 2021 06:56  Phos  2.0     08-24  Mg     2.2     08-24    TPro  6.5  /  Alb  3.5  /  TBili  0.3  /  DBili  x   /  AST  13  /  ALT  23  /  AlkPhos  61  08-24    LIVER FUNCTIONS - ( 24 Aug 2021 06:56 )  Alb: 3.5 g/dL / Pro: 6.5 g/dL / ALK PHOS: 61 U/L / ALT: 23 U/L DA / AST: 13 U/L / GGT: x           PT/INR - ( 23 Aug 2021 16:32 )   PT: 12.8 sec;   INR: 1.08 ratio         PTT - ( 23 Aug 2021 16:32 )  PTT:30.4 sec  ____________________________________________    IMAGING: CTA showing no signs of GI Hemorrhage          [facility]    Patient is a 55y old  Female who presents with a chief complaint of Upper GI bleed (23 Aug 2021 18:05)    HPI:  56 yo F from home with Pmhx of HTN, DM, OA, GERD, on aspirin for 18 months, came for chief complains of hematemesis and melena. Pt states that she started noticing black stools since yesterday. So far she has 7 black bowel movements, moderate in amount, soft in consistency. Last BM 3 hours ago. She had one episode of bloody vomiting in morning. She noticed few clots , small in size, moderate amount, red- maroon in color , no food particles. PT endorses to have nausea and loss of appetite for 2 days. She has tenesmus, fatigue and weakness but denies fevers, shortness of breadth, iron pills, weight loss, colonoscopy and endoscopy in the past, spicy food intake, abdominal pain.   Pt further mentioned that she is using baking soda and also having alot of red bull drink recently.  (23 Aug 2021 18:05)    PMH/PSH:  PAST MEDICAL & SURGICAL HISTORY:  HTN (hypertension)    Diabetes mellitus    OA (osteoarthritis)    GERD (gastroesophageal reflux disease)    S/P cholecystectomy      FH:  FAMILY HISTORY:  Denies fhx of GI disorders.     MEDS:  MEDICATIONS  (STANDING):  insulin lispro (ADMELOG) corrective regimen sliding scale   SubCutaneous every 6 hours  lactated ringers. 1000 milliLiter(s) (100 mL/Hr) IV Continuous <Continuous>  nicotine -  14 mG/24Hr(s) Patch 1 patch Transdermal daily  pantoprazole Infusion 8 mG/Hr (10 mL/Hr) IV Continuous <Continuous>    MEDICATIONS  (PRN):    Allergies    No Known Allergies    Intolerances            CONSTITUTIONAL:  No weight loss, fever, chills, weakness or fatigue.  HEENT:  Eyes:  No visual loss, blurred vision, double vision or yellow sclerae. Ears, Nose, Throat:  No hearing loss, sneezing, congestion, runny nose or sore throat.  SKIN:  No rash or itching.  CARDIOVASCULAR:  No chest pain, chest pressure or chest discomfort. No palpitations or edema.  RESPIRATORY:  No shortness of breath, cough or sputum.  GASTROINTESTINAL:  SEE HPI  GENITOURINARY:  No dysuria, hematuria, urinary frequency  NEUROLOGICAL:  No headache, dizziness, syncope, paralysis, ataxia, numbness or tingling in the extremities. No change in bowel or bladder control.  MUSCULOSKELETAL:  No muscle, back pain, joint pain or stiffness.  HEMATOLOGIC:  No anemia, bleeding or bruising.  LYMPHATICS:  No enlarged nodes. No history of splenectomy.  PSYCHIATRIC:  No history of depression or anxiety.  ENDOCRINOLOGIC:  No reports of sweating, cold or heat intolerance. No polyuria or polydipsia.    GI HPI  56 yo F with hx of HTN, DM, OA (reports using Advil), GERD and +tobacco user came for chief complaint of hematemesis and melena x1 day. Patient denies ever having an EGD/Colonoscopy. Admitted for upper GI bleed         ______________________________________________________________________  PHYSICAL EXAM:  T(C): 36.4 (08-24-21 @ 07:44), Max: 37.2 (08-23-21 @ 21:05)  HR: 100 (08-24-21 @ 07:44)  BP: 130/71 (08-24-21 @ 07:44)  RR: 18 (08-24-21 @ 07:44)  SpO2: 99% (08-24-21 @ 07:44)  Wt(kg): --      GEN: NAD, normocephalic  CVS: S1S2+  CHEST: clear to auscultation  ABD: soft , nontender, nondistended, bowel sounds present  EXTR: no cyanosis, no clubbing, no edema  NEURO: Awake and alert; oriented to person, place, time   SKIN:  warm;  non icteric    ______________________________________________________________________  LABS:                        6.7    19.30 )-----------( 383      ( 24 Aug 2021 06:56 )             19.4     08-24    140  |  107  |  30<H>  ----------------------------<  139<H>  3.2<L>   |  27  |  0.67    Ca    8.7      24 Aug 2021 06:56  Phos  2.0     08-24  Mg     2.2     08-24    TPro  6.5  /  Alb  3.5  /  TBili  0.3  /  DBili  x   /  AST  13  /  ALT  23  /  AlkPhos  61  08-24    LIVER FUNCTIONS - ( 24 Aug 2021 06:56 )  Alb: 3.5 g/dL / Pro: 6.5 g/dL / ALK PHOS: 61 U/L / ALT: 23 U/L DA / AST: 13 U/L / GGT: x           PT/INR - ( 23 Aug 2021 16:32 )   PT: 12.8 sec;   INR: 1.08 ratio         PTT - ( 23 Aug 2021 16:32 )  PTT:30.4 sec  ____________________________________________    IMAGING: CTA showing no signs of GI Hemorrhage

## 2021-08-24 NOTE — PROGRESS NOTE ADULT - SUBJECTIVE AND OBJECTIVE BOX
PGY1 Progress Note discussed with attending     PAGER #: [[phone]] TILL 5:00 PM  PLEASE CONTACT ON CALL TEAM:  - On Call Team (Please refer to Jessica) FROM 5:00 PM - 8:30PM  - Nightfloat Team FROM 8:30 -7:30 AM    CHIEF COMPLAINT & BRIEF HOSPITAL COURSE:56 yo F from home with Pmhx of HTN, DM, OA, GERD, on aspirin for 18 months, came for chief complains of hematemesis and melena. Pt states that she started noticing black stools since yesterday. So far she has 7 black bowel movements, moderate in amount, soft in consistency. Last BM 3 hours ago. She had one episode of bloody vomiting in morning. She noticed few clots , small in size, moderate amount, red- maroon in color , no food particles. PT endorses to have nausea and loss of appetite for 2 days. She has tenesmus, fatigue and weakness but denies fevers, shortness of breadth, iron pills, weight loss, colonoscopy and endoscopy in the past spicy food intake, abdominal pain. In the ED patient found to have Hgb 7.1 given 1 unit PRBC and 1 unit plts. CT Angio negative     INTERVAL HPI/OVERNIGHT EVENTS:       REVIEW OF SYSTEMS:  CONSTITUTIONAL: No fever, weight loss, or fatigue  RESPIRATORY: No cough, wheezing, chills or hemoptysis; No shortness of breath  CARDIOVASCULAR: No chest pain, palpitations, dizziness, or leg swelling  GASTROINTESTINAL: No abdominal pain. No nausea, vomiting, or hematemesis; No diarrhea or constipation. No melena or hematochezia.  GENITOURINARY: No dysuria or hematuria, urinary frequency  NEUROLOGICAL: No headaches, memory loss, loss of strength, numbness, or tremors  SKIN: No itching, burning, rashes, or lesions     Vital Signs Last 24 Hrs  T(C): 36.9 (24 Aug 2021 12:40), Max: 37.2 (23 Aug 2021 21:05)  T(F): 98.4 (24 Aug 2021 12:40), Max: 99 (23 Aug 2021 21:05)  HR: 103 (24 Aug 2021 12:40) (99 - 118)  BP: 144/71 (24 Aug 2021 12:40) (108/70 - 157/92)  BP(mean): 89 (24 Aug 2021 11:16) (82 - 89)  RR: 18 (24 Aug 2021 12:40) (17 - 19)  SpO2: 99% (24 Aug 2021 11:16) (96% - 100%)    PHYSICAL EXAMINATION:  GENERAL: NAD, well built  HEAD:  Atraumatic, Normocephalic  EYES:  conjunctiva and sclera clear  NECK: Supple, No JVD, Normal thyroid  CHEST/LUNG: Clear to auscultation. Clear to percussion bilaterally; No rales, rhonchi, wheezing, or rubs  HEART: Regular rate and rhythm; No murmurs, rubs, or gallops  ABDOMEN: Soft, Nontender, Nondistended; Bowel sounds present  NERVOUS SYSTEM:  Alert & Oriented X3,    EXTREMITIES:  2+ Peripheral Pulses, No clubbing, cyanosis, or edema  SKIN: warm dry                          7.8    17.30 )-----------( 386      ( 24 Aug 2021 12:51 )             22.6     08-24    140  |  107  |  30<H>  ----------------------------<  139<H>  3.2<L>   |  27  |  0.67    Ca    8.7      24 Aug 2021 06:56  Phos  2.0     08-24  Mg     2.2     08-24    TPro  6.5  /  Alb  3.5  /  TBili  0.3  /  DBili  x   /  AST  13  /  ALT  23  /  AlkPhos  61  08-24    LIVER FUNCTIONS - ( 24 Aug 2021 06:56 )  Alb: 3.5 g/dL / Pro: 6.5 g/dL / ALK PHOS: 61 U/L / ALT: 23 U/L DA / AST: 13 U/L / GGT: x           CARDIAC MARKERS ( 23 Aug 2021 16:32 )  0.043 ng/mL / x     / 39 U/L / x     / x          PT/INR - ( 23 Aug 2021 16:32 )   PT: 12.8 sec;   INR: 1.08 ratio         PTT - ( 23 Aug 2021 16:32 )  PTT:30.4 sec    CAPILLARY BLOOD GLUCOSE      RADIOLOGY & ADDITIONAL TESTS:                   PGY1 Progress Note discussed with attending     PAGER #: [[phone]] TILL 5:00 PM  PLEASE CONTACT ON CALL TEAM:  - On Call Team (Please refer to Jessica) FROM 5:00 PM - 8:30PM  - Nightfloat Team FROM 8:30 -7:30 AM    CHIEF COMPLAINT & BRIEF HOSPITAL COURSE:54 yo F from home with Pmhx of HTN, DM, OA, GERD, on aspirin for 18 months, came for chief complains of hematemesis and melena. Pt states that she started noticing black stools since yesterday. So far she has 7 black bowel movements, moderate in amount, soft in consistency. Last BM 3 hours ago. She had one episode of bloody vomiting in morning. She noticed few clots , small in size, moderate amount, red- maroon in color , no food particles. PT endorses to have nausea and loss of appetite for 2 days. She has tenesmus, fatigue and weakness but denies fevers, shortness of breadth, iron pills, weight loss, colonoscopy and endoscopy in the past spicy food intake, abdominal pain. In the ED patient found to have Hgb 7.1 given 1 unit PRBC and 1 unit plts. CT Angio negative for acute hemorrhage, IVF and protonix drip was started. After 1 unit Hgb 7.7    INTERVAL HPI/OVERNIGHT EVENTS: [place]ian speaking female  ID #[id number] No acute events overnight, CBC shows Hgb 6.7. Patient scheduled for EGD today. Will need 2 units PRBC.       REVIEW OF SYSTEMS:  CONSTITUTIONAL: No fever, weight loss, or fatigue  RESPIRATORY: No cough, wheezing, chills or hemoptysis; No shortness of breath  CARDIOVASCULAR: No chest pain, palpitations, dizziness, or leg swelling  GASTROINTESTINAL: No abdominal pain. No nausea, vomiting, or hematemesis; No diarrhea or constipation. No melena or hematochezia.  GENITOURINARY: No dysuria or hematuria, urinary frequency  NEUROLOGICAL: No headaches, memory loss, loss of strength, numbness, or tremors  SKIN: No itching, burning, rashes, or lesions     Vital Signs Last 24 Hrs  T(C): 36.9 (24 Aug 2021 12:40), Max: 37.2 (23 Aug 2021 21:05)  T(F): 98.4 (24 Aug 2021 12:40), Max: 99 (23 Aug 2021 21:05)  HR: 103 (24 Aug 2021 12:40) (99 - 118)  BP: 144/71 (24 Aug 2021 12:40) (108/70 - 157/92)  BP(mean): 89 (24 Aug 2021 11:16) (82 - 89)  RR: 18 (24 Aug 2021 12:40) (17 - 19)  SpO2: 99% (24 Aug 2021 11:16) (96% - 100%)    PHYSICAL EXAMINATION:  GENERAL: NAD, well built  HEAD:  Atraumatic, Normocephalic  EYES:  conjunctiva and sclera clear  NECK: Supple, No JVD, Normal thyroid  CHEST/LUNG: Clear to auscultation No rales, rhonchi, wheezing, or rubs  HEART: Regular rate and rhythm; No murmurs, rubs, or gallops  ABDOMEN: Soft, Nontender, Nondistended; Bowel sounds present  NERVOUS SYSTEM:  Alert & Oriented X3,    EXTREMITIES:  2+ Peripheral Pulses, No clubbing, cyanosis, or edema  SKIN: warm dry                          7.8    17.30 )-----------( 386      ( 24 Aug 2021 12:51 )             22.6     08-24    140  |  107  |  30<H>  ----------------------------<  139<H>  3.2<L>   |  27  |  0.67    Ca    8.7      24 Aug 2021 06:56  Phos  2.0     08-24  Mg     2.2     08-24    TPro  6.5  /  Alb  3.5  /  TBili  0.3  /  DBili  x   /  AST  13  /  ALT  23  /  AlkPhos  61  08-24    LIVER FUNCTIONS - ( 24 Aug 2021 06:56 )  Alb: 3.5 g/dL / Pro: 6.5 g/dL / ALK PHOS: 61 U/L / ALT: 23 U/L DA / AST: 13 U/L / GGT: x           CARDIAC MARKERS ( 23 Aug 2021 16:32 )  0.043 ng/mL / x     / 39 U/L / x     / x          PT/INR - ( 23 Aug 2021 16:32 )   PT: 12.8 sec;   INR: 1.08 ratio         PTT - ( 23 Aug 2021 16:32 )  PTT:30.4 sec    CAPILLARY BLOOD GLUCOSE      RADIOLOGY & ADDITIONAL TESTS:                   PGY1 Progress Note discussed with attending     PAGER #: [[phone]] TILL 5:00 PM  PLEASE CONTACT ON CALL TEAM:  - On Call Team (Please refer to Jessica) FROM 5:00 PM - 8:30PM  - Nightfloat Team FROM 8:30 -7:30 AM    CHIEF COMPLAINT & BRIEF HOSPITAL COURSE:56 yo F from home with Pmhx of HTN, DM, OA, GERD, on aspirin for 18 months, came for chief complains of hematemesis and melena. Pt states that she started noticing black stools since yesterday. So far she has 7 black bowel movements, moderate in amount, soft in consistency. Last BM 3 hours ago. She had one episode of bloody vomiting in morning. She noticed few clots , small in size, moderate amount, red- maroon in color , no food particles. PT endorses to have nausea and loss of appetite for 2 days. She has tenesmus, fatigue and weakness but denies fevers, shortness of breadth, iron pills, weight loss, colonoscopy and endoscopy in the past spicy food intake, abdominal pain. In the ED patient found to have Hgb 7.1 given 1 unit PRBC and 1 unit plts. CT Angio negative for acute hemorrhage, IVF and protonix drip was started. After 1 unit Hgb 7.7    INTERVAL HPI/OVERNIGHT EVENTS: [place] speaking female  ID #[id number] No acute events overnight, CBC shows Hgb 6.7. Patient scheduled for EGD today. Will need 2 units PRBC.       REVIEW OF SYSTEMS:  CONSTITUTIONAL: No fever, weight loss, or fatigue  RESPIRATORY: No cough, wheezing, chills or hemoptysis; No shortness of breath  CARDIOVASCULAR: No chest pain, palpitations, dizziness, or leg swelling  GASTROINTESTINAL: No abdominal pain. No nausea, vomiting, or hematemesis; No diarrhea or constipation. No melena or hematochezia.  GENITOURINARY: No dysuria or hematuria, urinary frequency  NEUROLOGICAL: No headaches, memory loss, loss of strength, numbness, or tremors  SKIN: No itching, burning, rashes, or lesions     Vital Signs Last 24 Hrs  T(C): 36.9 (24 Aug 2021 12:40), Max: 37.2 (23 Aug 2021 21:05)  T(F): 98.4 (24 Aug 2021 12:40), Max: 99 (23 Aug 2021 21:05)  HR: 103 (24 Aug 2021 12:40) (99 - 118)  BP: 144/71 (24 Aug 2021 12:40) (108/70 - 157/92)  BP(mean): 89 (24 Aug 2021 11:16) (82 - 89)  RR: 18 (24 Aug 2021 12:40) (17 - 19)  SpO2: 99% (24 Aug 2021 11:16) (96% - 100%)    PHYSICAL EXAMINATION:  GENERAL: NAD, well built  HEAD:  Atraumatic, Normocephalic  EYES:  conjunctiva and sclera clear  NECK: Supple, No JVD, Normal thyroid  CHEST/LUNG: Clear to auscultation No rales, rhonchi, wheezing, or rubs  HEART: Regular rate and rhythm; No murmurs, rubs, or gallops  ABDOMEN: Soft, Nontender, Nondistended; Bowel sounds present  NERVOUS SYSTEM:  Alert & Oriented X3,    EXTREMITIES:  2+ Peripheral Pulses, No clubbing, cyanosis, or edema  SKIN: warm dry                          7.8    17.30 )-----------( 386      ( 24 Aug 2021 12:51 )             22.6     08-24    140  |  107  |  30<H>  ----------------------------<  139<H>  3.2<L>   |  27  |  0.67    Ca    8.7      24 Aug 2021 06:56  Phos  2.0     08-24  Mg     2.2     08-24    TPro  6.5  /  Alb  3.5  /  TBili  0.3  /  DBili  x   /  AST  13  /  ALT  23  /  AlkPhos  61  08-24    LIVER FUNCTIONS - ( 24 Aug 2021 06:56 )  Alb: 3.5 g/dL / Pro: 6.5 g/dL / ALK PHOS: 61 U/L / ALT: 23 U/L DA / AST: 13 U/L / GGT: x           CARDIAC MARKERS ( 23 Aug 2021 16:32 )  0.043 ng/mL / x     / 39 U/L / x     / x          PT/INR - ( 23 Aug 2021 16:32 )   PT: 12.8 sec;   INR: 1.08 ratio         PTT - ( 23 Aug 2021 16:32 )  PTT:30.4 sec

## 2021-08-24 NOTE — PROGRESS NOTE ADULT - PROBLEM SELECTOR PLAN 1
-c/w hematemesis and melena   -DDx: PUD vs Aspirin Use  -1 lit of bolus NS in ED   -Going to get 1PRBC and 1 Plt STAT  -CT Angio A/P with IV Contrast negative for acute hemorrhage   -Standing fluids 100cc/hr LR  -CBC q6h, currently Hgb 6.7   - will order 2 units PRBC, GI Dr. Antonio Hgb needs to be 8 for EGD.   -Protonix drip   -NPO -a/w hematemesis and melena   -DDx: PUD vs Aspirin Use  -1 lit of bolus NS in ED   -Going to get 1PRBC and 1 Plt STAT  -CT Angio A/P with IV Contrast negative for acute hemorrhage   -Standing fluids 100cc/hr LR  -CBC q6h, currently Hgb 6.7   - will order 2 units PRBC  -Protonix drip   -NPO

## 2021-08-24 NOTE — PROGRESS NOTE ADULT - SUBJECTIVE AND OBJECTIVE BOX
Esophagogastroduodenoscopy Report    Indication: UGIB, melena, hematemesis     Referring MD: Camden Loredo    Instrument #: [id number], [id number]    Anesthesia:  MAC    Consent:  Informed consent was obtained from the patient after providing any opportunity for questions    Procedure:  The gastroscope was gently passed through the incisoral orifice into the oral cavity and under direct visualization the esophagus was intubated. The endoscope was passed down the esophagus, through the stomach and into the second portion of the duodenum. Color, texture, mucosa and anatomy of the esophagus, stomach, and duodenum were carefully examined with the scope. The patient tolerated the procedure well. After completion of the examination, the patient was transferred to the recovery room.     Preparation:  NPO     Findings:   Oropharynx: Normal.  Esophagus: Normal.  Stomach: One large 12 mm ulcer was found in the pyloric channel with a large pulsating vessel with immediate contact bleeding. A 5 mm Coagrasper was used with soft coagulation settings to cauterize the vessel however oozing bleeding remained from its edges. One 12 mm over-the-scope clip was then deployed over the vessel/ulcer base with complete hemostasis achieved. The therapeutic endoscope was able to traverse the pylorus easily post clip deployment without stenosis.  Duodenum: Normal.     Date and time: 8/24/2021    EBL: Mild.     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 Complete hemostasis achieved using cautery and one over-the-scope clip.     Plan:    Return to hospital [facility] for ongoing care.  Clear liquid diet today.  Transition to Pantoprazole 40mg IV q12h.  Avoid NSAIDs.  Check stool H. pylori Ag, treat if positive.   Monitor for any recurring overt GI bleeding.                         Procedure Start Time: 1429  Procedure End Time: 1500          Attending: Paco Gaviria MD

## 2021-08-24 NOTE — CONSULT NOTE ADULT - ATTENDING COMMENTS
Patient seen and examined. Agree with above with following addenda:    In short, pt is a 55F presenting with melena, coffee ground emesis in setting of NSAID use w/ Hb 7.1, s/p 1U PRBC without appropriate response to 6.7. Mildly tachycardic initially but normotensive. No hx or signs of cirrhosis. Ddx includes PUD, AVMs, etc. Would continue resuscitation, goal Hb>8 for anesthesia. PPI gtt. Maintain NPO. Plan tentatively for EGD today to evaluate pending repeat CBC post transfusion. Please notify us if develops HD instability, or significant recurring overt bleed (i.e. hematemesis, further melena, etc). Patient seen and examined. Agree with above with following addenda:    In short, pt is a 55F presenting with melena, coffee ground emesis in setting of NSAID use w/ Hb 7.1, s/p 1U PRBC without appropriate response to 6.7. Mildly tachycardic initially but normotensive. No hx or signs of cirrhosis. Ddx includes PUD, AVMs, etc. Would continue resuscitation, goal Hb 7-8 for anesthesia. PPI gtt. Maintain NPO. Plan tentatively for EGD today to evaluate pending repeat CBC post transfusion. Please notify us if develops HD instability, or significant recurring overt bleed (i.e. hematemesis, further melena, etc).

## 2021-08-24 NOTE — CONSULT NOTE ADULT - PROBLEM SELECTOR RECOMMENDATION 9
hgb 6.7 s/p 1 unit PRBC's  FOBT+  plan for EGD likely today will need to transfuse to hgb 8  c/w protonix infusion  monitor CBC  NPO hgb 6.7 s/p 1 unit PRBC's  FOBT+  plan for EGD likely today, goal Hb>8.  c/w protonix infusion  monitor CBC  NPO hgb 6.7 s/p 1 unit PRBC's  FOBT+  plan for EGD likely today, goal Hb around 7-8  c/w protonix infusion  monitor CBC  NPO

## 2021-08-25 DIAGNOSIS — E83.39 OTHER DISORDERS OF PHOSPHORUS METABOLISM: ICD-10-CM

## 2021-08-25 LAB
ALBUMIN SERPL ELPH-MCNC: 3.4 G/DL — LOW (ref 3.5–5)
ALP SERPL-CCNC: 62 U/L — SIGNIFICANT CHANGE UP (ref 40–120)
ALT FLD-CCNC: 44 U/L DA — SIGNIFICANT CHANGE UP (ref 10–60)
ANION GAP SERPL CALC-SCNC: 6 MMOL/L — SIGNIFICANT CHANGE UP (ref 5–17)
AST SERPL-CCNC: 41 U/L — HIGH (ref 10–40)
BILIRUB SERPL-MCNC: 0.3 MG/DL — SIGNIFICANT CHANGE UP (ref 0.2–1.2)
BUN SERPL-MCNC: 13 MG/DL — SIGNIFICANT CHANGE UP (ref 7–18)
CALCIUM SERPL-MCNC: 8.5 MG/DL — SIGNIFICANT CHANGE UP (ref 8.4–10.5)
CHLORIDE SERPL-SCNC: 108 MMOL/L — SIGNIFICANT CHANGE UP (ref 96–108)
CO2 SERPL-SCNC: 27 MMOL/L — SIGNIFICANT CHANGE UP (ref 22–31)
CREAT SERPL-MCNC: 0.59 MG/DL — SIGNIFICANT CHANGE UP (ref 0.5–1.3)
GLUCOSE BLDC GLUCOMTR-MCNC: 156 MG/DL — HIGH (ref 70–99)
GLUCOSE BLDC GLUCOMTR-MCNC: 161 MG/DL — HIGH (ref 70–99)
GLUCOSE BLDC GLUCOMTR-MCNC: 166 MG/DL — HIGH (ref 70–99)
GLUCOSE BLDC GLUCOMTR-MCNC: 188 MG/DL — HIGH (ref 70–99)
GLUCOSE BLDC GLUCOMTR-MCNC: 199 MG/DL — HIGH (ref 70–99)
GLUCOSE SERPL-MCNC: 141 MG/DL — HIGH (ref 70–99)
HCT VFR BLD CALC: 20.7 % — CRITICAL LOW (ref 34.5–45)
HCT VFR BLD CALC: 21.4 % — LOW (ref 34.5–45)
HGB BLD-MCNC: 7.3 G/DL — LOW (ref 11.5–15.5)
HGB BLD-MCNC: 7.3 G/DL — LOW (ref 11.5–15.5)
MAGNESIUM SERPL-MCNC: 2.2 MG/DL — SIGNIFICANT CHANGE UP (ref 1.6–2.6)
MCHC RBC-ENTMCNC: 31.6 PG — SIGNIFICANT CHANGE UP (ref 27–34)
MCHC RBC-ENTMCNC: 32.4 PG — SIGNIFICANT CHANGE UP (ref 27–34)
MCHC RBC-ENTMCNC: 34.1 GM/DL — SIGNIFICANT CHANGE UP (ref 32–36)
MCHC RBC-ENTMCNC: 35.3 GM/DL — SIGNIFICANT CHANGE UP (ref 32–36)
MCV RBC AUTO: 92 FL — SIGNIFICANT CHANGE UP (ref 80–100)
MCV RBC AUTO: 92.6 FL — SIGNIFICANT CHANGE UP (ref 80–100)
NRBC # BLD: 0 /100 WBCS — SIGNIFICANT CHANGE UP (ref 0–0)
NRBC # BLD: 0 /100 WBCS — SIGNIFICANT CHANGE UP (ref 0–0)
PHOSPHATE SERPL-MCNC: 2.4 MG/DL — LOW (ref 2.5–4.5)
PLATELET # BLD AUTO: 423 K/UL — HIGH (ref 150–400)
PLATELET # BLD AUTO: 441 K/UL — HIGH (ref 150–400)
POTASSIUM SERPL-MCNC: 3.7 MMOL/L — SIGNIFICANT CHANGE UP (ref 3.5–5.3)
POTASSIUM SERPL-SCNC: 3.7 MMOL/L — SIGNIFICANT CHANGE UP (ref 3.5–5.3)
PROT SERPL-MCNC: 6.1 G/DL — SIGNIFICANT CHANGE UP (ref 6–8.3)
RBC # BLD: 2.25 M/UL — LOW (ref 3.8–5.2)
RBC # BLD: 2.31 M/UL — LOW (ref 3.8–5.2)
RBC # FLD: 13.8 % — SIGNIFICANT CHANGE UP (ref 10.3–14.5)
RBC # FLD: 13.9 % — SIGNIFICANT CHANGE UP (ref 10.3–14.5)
SODIUM SERPL-SCNC: 141 MMOL/L — SIGNIFICANT CHANGE UP (ref 135–145)
WBC # BLD: 13.43 K/UL — HIGH (ref 3.8–10.5)
WBC # BLD: 14 K/UL — HIGH (ref 3.8–10.5)
WBC # FLD AUTO: 13.43 K/UL — HIGH (ref 3.8–10.5)
WBC # FLD AUTO: 14 K/UL — HIGH (ref 3.8–10.5)

## 2021-08-25 PROCEDURE — 99232 SBSQ HOSP IP/OBS MODERATE 35: CPT

## 2021-08-25 PROCEDURE — 99233 SBSQ HOSP IP/OBS HIGH 50: CPT | Mod: GC

## 2021-08-25 RX ORDER — POTASSIUM PHOSPHATE, MONOBASIC POTASSIUM PHOSPHATE, DIBASIC 236; 224 MG/ML; MG/ML
15 INJECTION, SOLUTION INTRAVENOUS ONCE
Refills: 0 | Status: COMPLETED | OUTPATIENT
Start: 2021-08-25 | End: 2021-08-25

## 2021-08-25 RX ORDER — POTASSIUM PHOSPHATE, MONOBASIC POTASSIUM PHOSPHATE, DIBASIC 236; 224 MG/ML; MG/ML
30 INJECTION, SOLUTION INTRAVENOUS ONCE
Refills: 0 | Status: COMPLETED | OUTPATIENT
Start: 2021-08-25 | End: 2021-08-25

## 2021-08-25 RX ADMIN — POTASSIUM PHOSPHATE, MONOBASIC POTASSIUM PHOSPHATE, DIBASIC 83.33 MILLIMOLE(S): 236; 224 INJECTION, SOLUTION INTRAVENOUS at 13:54

## 2021-08-25 RX ADMIN — Medication 600 MILLIGRAM(S): at 17:04

## 2021-08-25 RX ADMIN — PANTOPRAZOLE SODIUM 40 MILLIGRAM(S): 20 TABLET, DELAYED RELEASE ORAL at 17:04

## 2021-08-25 RX ADMIN — GABAPENTIN 100 MILLIGRAM(S): 400 CAPSULE ORAL at 06:10

## 2021-08-25 RX ADMIN — Medication 600 MILLIGRAM(S): at 06:10

## 2021-08-25 RX ADMIN — Medication 1: at 11:49

## 2021-08-25 RX ADMIN — Medication 1: at 08:48

## 2021-08-25 RX ADMIN — PANTOPRAZOLE SODIUM 40 MILLIGRAM(S): 20 TABLET, DELAYED RELEASE ORAL at 06:10

## 2021-08-25 RX ADMIN — Medication 1: at 17:04

## 2021-08-25 RX ADMIN — GABAPENTIN 100 MILLIGRAM(S): 400 CAPSULE ORAL at 17:04

## 2021-08-25 NOTE — PROGRESS NOTE ADULT - ASSESSMENT
56 yo F with hx of HTN, DM, OA, GERD came for chief complain fo hematemesis and melena . Admitted for upper GI bleed 
56 yo F with hx of HTN, DM, OA, GERD came for chief complain fo hematemesis and melena . Admitted for upper GI bleed. Patient underwent EGD showing  a 12mm Pyloric channel ulcer with large pulsating vessel. Complete hemostasis was achieved using cautery and one over-the-scope clip. Current Hgb is stable at 7.3

## 2021-08-25 NOTE — PROGRESS NOTE ADULT - SUBJECTIVE AND OBJECTIVE BOX
PGY1 Progress Note discussed with attending     PAGER #: [[phone]] TILL 5:00 PM  PLEASE CONTACT ON CALL TEAM:  - On Call Team (Please refer to Jessica) FROM 5:00 PM - 8:30PM  - Nightfloat Team FROM 8:30 -7:30 AM    CHIEF COMPLAINT & BRIEF HOSPITAL COURSE:    INTERVAL HPI/OVERNIGHT EVENTS:       REVIEW OF SYSTEMS:  CONSTITUTIONAL: No fever, weight loss, or fatigue  RESPIRATORY: No cough, wheezing, chills or hemoptysis; No shortness of breath  CARDIOVASCULAR: No chest pain, palpitations, dizziness, or leg swelling  GASTROINTESTINAL: No abdominal pain. No nausea, vomiting, or hematemesis; No diarrhea or constipation. No melena or hematochezia.  GENITOURINARY: No dysuria or hematuria, urinary frequency  NEUROLOGICAL: No headaches, memory loss, loss of strength, numbness, or tremors  SKIN: No itching, burning, rashes, or lesions     Vital Signs Last 24 Hrs  T(C): 36.7 (25 Aug 2021 04:38), Max: 36.9 (24 Aug 2021 09:45)  T(F): 98 (25 Aug 2021 04:38), Max: 98.5 (24 Aug 2021 09:45)  HR: 88 (25 Aug 2021 04:38) (84 - 105)  BP: 134/69 (25 Aug 2021 04:38) (130/71 - 154/63)  BP(mean): 89 (24 Aug 2021 11:16) (82 - 89)  RR: 20 (25 Aug 2021 04:38) (18 - 20)  SpO2: 100% (25 Aug 2021 04:38) (98% - 100%)    PHYSICAL EXAMINATION:  GENERAL: NAD, well built  HEAD:  Atraumatic, Normocephalic  EYES:  conjunctiva and sclera clear  NECK: Supple, No JVD, Normal thyroid  CHEST/LUNG: Clear to auscultation. Clear to percussion bilaterally; No rales, rhonchi, wheezing, or rubs  HEART: Regular rate and rhythm; No murmurs, rubs, or gallops  ABDOMEN: Soft, Nontender, Nondistended; Bowel sounds present  NERVOUS SYSTEM:  Alert & Oriented X3,    EXTREMITIES:  2+ Peripheral Pulses, No clubbing, cyanosis, or edema  SKIN: warm dry                          7.3    14.00 )-----------( 423      ( 25 Aug 2021 00:06 )             20.7     08-24    140  |  107  |  30<H>  ----------------------------<  139<H>  3.2<L>   |  27  |  0.67    Ca    8.7      24 Aug 2021 06:56  Phos  2.0     08-24  Mg     2.2     08-24    TPro  6.5  /  Alb  3.5  /  TBili  0.3  /  DBili  x   /  AST  13  /  ALT  23  /  AlkPhos  61  08-24    LIVER FUNCTIONS - ( 24 Aug 2021 06:56 )  Alb: 3.5 g/dL / Pro: 6.5 g/dL / ALK PHOS: 61 U/L / ALT: 23 U/L DA / AST: 13 U/L / GGT: x           CARDIAC MARKERS ( 23 Aug 2021 16:32 )  0.043 ng/mL / x     / 39 U/L / x     / x          PT/INR - ( 23 Aug 2021 16:32 )   PT: 12.8 sec;   INR: 1.08 ratio         PTT - ( 23 Aug 2021 16:32 )  PTT:30.4 sec    CAPILLARY BLOOD GLUCOSE      RADIOLOGY & ADDITIONAL TESTS:                   PGY1 Progress Note discussed with attending     PAGER #: [[phone]] TILL 5:00 PM  PLEASE CONTACT ON CALL TEAM:  - On Call Team (Please refer to Jessica) FROM 5:00 PM - 8:30PM  - Nightfloat Team FROM 8:30 -7:30 AM    CHIEF COMPLAINT & BRIEF HOSPITAL COURSE:    INTERVAL HPI/OVERNIGHT EVENTS: Patient underwent EGD yesterday, Significant findings showed a 12mm Pyloric channel ulcer with large pulsating vessel. Complete hemostasis was achieved using cautery and one over-the-scope clip.  Currently patient HGB 7.3 prior Hgb was 7.3. [place]  ID # [id number], patient has no episodes of melena, emesis or hematochezia. No acute events overnight.       REVIEW OF SYSTEMS:  CONSTITUTIONAL: No fever, weight loss, or fatigue  RESPIRATORY: No cough, wheezing, chills or hemoptysis; No shortness of breath  CARDIOVASCULAR: No chest pain, palpitations, dizziness, or leg swelling  GASTROINTESTINAL: No abdominal pain. No nausea, vomiting, or hematemesis; No diarrhea or constipation. No melena or hematochezia.  GENITOURINARY: No dysuria or hematuria, urinary frequency  NEUROLOGICAL: No headaches, memory loss, loss of strength, numbness, or tremors  SKIN: No itching, burning, rashes, or lesions     Vital Signs Last 24 Hrs  T(C): 36.7 (25 Aug 2021 04:38), Max: 36.9 (24 Aug 2021 09:45)  T(F): 98 (25 Aug 2021 04:38), Max: 98.5 (24 Aug 2021 09:45)  HR: 88 (25 Aug 2021 04:38) (84 - 105)  BP: 134/69 (25 Aug 2021 04:38) (130/71 - 154/63)  BP(mean): 89 (24 Aug 2021 11:16) (82 - 89)  RR: 20 (25 Aug 2021 04:38) (18 - 20)  SpO2: 100% (25 Aug 2021 04:38) (98% - 100%)    PHYSICAL EXAMINATION:  GENERAL: NAD, well built  HEAD:  Atraumatic, Normocephalic  EYES:  conjunctiva and sclera clear  NECK: Supple, No JVD, Normal thyroid  CHEST/LUNG: Clear to auscultation. Clear to percussion bilaterally; No rales, rhonchi, wheezing, or rubs  HEART: Regular rate and rhythm; No murmurs, rubs, or gallops  ABDOMEN: Soft, Nontender, Nondistended; Bowel sounds present  NERVOUS SYSTEM:  Alert & Oriented X3,    EXTREMITIES:  2+ Peripheral Pulses, No clubbing, cyanosis, or edema  SKIN: warm dry                          7.3    14.00 )-----------( 423      ( 25 Aug 2021 00:06 )             20.7     08-24    140  |  107  |  30<H>  ----------------------------<  139<H>  3.2<L>   |  27  |  0.67    Ca    8.7      24 Aug 2021 06:56  Phos  2.0     08-24  Mg     2.2     08-24    TPro  6.5  /  Alb  3.5  /  TBili  0.3  /  DBili  x   /  AST  13  /  ALT  23  /  AlkPhos  61  08-24    LIVER FUNCTIONS - ( 24 Aug 2021 06:56 )  Alb: 3.5 g/dL / Pro: 6.5 g/dL / ALK PHOS: 61 U/L / ALT: 23 U/L DA / AST: 13 U/L / GGT: x           CARDIAC MARKERS ( 23 Aug 2021 16:32 )  0.043 ng/mL / x     / 39 U/L / x     / x          PT/INR - ( 23 Aug 2021 16:32 )   PT: 12.8 sec;   INR: 1.08 ratio         PTT - ( 23 Aug 2021 16:32 )  PTT:30.4 sec    CAPILLARY BLOOD GLUCOSE      RADIOLOGY & ADDITIONAL TESTS:                   PGY1 Progress Note discussed with attending     PAGER #: [[phone]] TILL 5:00 PM  PLEASE CONTACT ON CALL TEAM:  - On Call Team (Please refer to Jessica) FROM 5:00 PM - 8:30PM  - Nightfloat Team FROM 8:30 -7:30 AM    CHIEF COMPLAINT & BRIEF HOSPITAL COURSE:    INTERVAL HPI/OVERNIGHT EVENTS: Patient underwent EGD yesterday, Significant findings showed a 12mm Pyloric channel ulcer with large pulsating vessel. Complete hemostasis was achieved using cautery and one over-the-scope clip.  Currently patient HGB 7.3 prior Hgb was 7.3. [place]  ID # [id number], patient has no episodes of melena, emesis or hematochezia. No acute events overnight.       REVIEW OF SYSTEMS:  CONSTITUTIONAL: No fever, weight loss, or fatigue  RESPIRATORY: No cough, wheezing, chills or hemoptysis; No shortness of breath  CARDIOVASCULAR: No chest pain, palpitations, dizziness, or leg swelling  GASTROINTESTINAL: No abdominal pain. No nausea, vomiting, or hematemesis; No diarrhea or constipation. No melena or hematochezia.  GENITOURINARY: No dysuria or hematuria, urinary frequency  NEUROLOGICAL: No headaches, memory loss, loss of strength, numbness, or tremors  SKIN: No itching, burning, rashes, or lesions     Vital Signs Last 24 Hrs  T(C): 36.7 (25 Aug 2021 04:38), Max: 36.9 (24 Aug 2021 09:45)  T(F): 98 (25 Aug 2021 04:38), Max: 98.5 (24 Aug 2021 09:45)  HR: 88 (25 Aug 2021 04:38) (84 - 105)  BP: 134/69 (25 Aug 2021 04:38) (130/71 - 154/63)  BP(mean): 89 (24 Aug 2021 11:16) (82 - 89)  RR: 20 (25 Aug 2021 04:38) (18 - 20)  SpO2: 100% (25 Aug 2021 04:38) (98% - 100%)    PHYSICAL EXAMINATION:  GENERAL: NAD, well built  HEAD:  Atraumatic, Normocephalic  EYES:  conjunctiva and sclera clear  NECK: Supple, No JVD, Normal thyroid  CHEST/LUNG: Clear to auscultation. Clear to percussion bilaterally; No rales, rhonchi, wheezing, or rubs  HEART: Regular rate and rhythm; No murmurs, rubs, or gallops  ABDOMEN: Soft, Nontender, Nondistended; Bowel sounds present  NERVOUS SYSTEM:  Alert & Oriented X3,    EXTREMITIES:  2+ Peripheral Pulses, No clubbing, cyanosis, or edema  SKIN: warm dry                          7.3    14.00 )-----------( 423      ( 25 Aug 2021 00:06 )             20.7     08-24    140  |  107  |  30<H>  ----------------------------<  139<H>  3.2<L>   |  27  |  0.67    Ca    8.7      24 Aug 2021 06:56  Phos  2.0     08-24  Mg     2.2     08-24    TPro  6.5  /  Alb  3.5  /  TBili  0.3  /  DBili  x   /  AST  13  /  ALT  23  /  AlkPhos  61  08-24    LIVER FUNCTIONS - ( 24 Aug 2021 06:56 )  Alb: 3.5 g/dL / Pro: 6.5 g/dL / ALK PHOS: 61 U/L / ALT: 23 U/L DA / AST: 13 U/L / GGT: x           CARDIAC MARKERS ( 23 Aug 2021 16:32 )  0.043 ng/mL / x     / 39 U/L / x     / x          PT/INR - ( 23 Aug 2021 16:32 )   PT: 12.8 sec;   INR: 1.08 ratio         PTT - ( 23 Aug 2021 16:32 )  PTT:30.4 sec

## 2021-08-25 NOTE — PROGRESS NOTE ADULT - PROBLEM SELECTOR PLAN 5
-Mild OA  -Denies taking NSAID for pain at home  -Avoid NSAIDS
-Mild OA  -Denies taking NSAID for pain at home

## 2021-08-25 NOTE — PROGRESS NOTE ADULT - PROBLEM SELECTOR PLAN 1
PUD 2/2 Aspirin Use   - EGD significant a 12mm Pyloric channel ulcer with large pulsating vessel. Bleeding was stopped using cauterization and clip.   -Current Hgb stable at 7.3, prior 7.3 s/p total 3 units PRBCs   - Avoid NSAIDs  - Pantoprazole 40mg Q12hrs   - Monitor for bleeding  - F/U CBC    - Patient started on clear liquid diet, tolerating, tomorrow patient will be started on regular diet, if tolerating and no bleeding can be discharged

## 2021-08-25 NOTE — PROGRESS NOTE ADULT - ATTENDING COMMENTS
Patient seen and examined. Doing well post EGD yesterday with cautery and over the scope clipping of large vessel within an ulcer at the pyloric channel. Tolerating clears well. Hb downtrended initially to 7.8->7.3 which is expected given active bleeding seen during endoscopy yesterday but Hb has now stabilized at 7.3. Reports few small episodes of melena overnight, suspect residual in light of Hb stability. May advance diet to full liquids today. Continue Pantoprazole 40mg IV q12h. If no signs of recurring bleed tomorrow, may advance diet to regular and can consider dc home with outpatient GI follow-up as will need repeat EGD in 2-3 months to ensure ulcer healing. Please notify us if develops and recurring bleed.

## 2021-08-25 NOTE — PROGRESS NOTE ADULT - PROBLEM SELECTOR PLAN 1
s/p EGD yesterday finding a large 12 mm ulcer in the pyloric channel    Complete hemostasis achieved using cautery and one over-the-scope clip  c/w Pantoprazole 40mg IV q12h  Monitor CBC

## 2021-08-25 NOTE — PROGRESS NOTE ADULT - ATTENDING COMMENTS
Patient seen and examined.  Case discussed with house staff.  Agree with above as edited.   Patient interviewed with Spanish , ID #[id number]  Patient is a 55yoF with h/o HTN, DM II, OA, GERD a/w acute blood loss anemia 2/2 Upper GI bleed.  Acute blood loss anemia 2/2 Upper GI bleed 2/2 Gastric ulcer- Patient s/p EGD with clips placed and hemostasis achieved. Patient is s/p 2u pRBCs and 1 u platelets during admission. h/h has now stabilized. PMD updated yesterday. ASA is for primary prophylaxis. Will hold for now. Patient counseled to avoid NSAIDs and stop ASA. c/w PPI. Advance diet to full liquid today and if doing well, regular tomorrow. d/w GI attending.  SIRS - Noninfectious SIRS in setting of GI bleed. No fever. no s/s of infection. Cxs are NGTD x 2.   HTN - holding home antihypertensives in setting of GI bleed. Resume as tolerated  DM - holding oral hypoglycemics. ISS with FS monitoring.  COPD - c/w symbicort  Plan d/w Patient  Case d/w GI Attending  d/c planning, possibly tomorrow if h/h stable and does well with diet.

## 2021-08-25 NOTE — PROGRESS NOTE ADULT - PROBLEM SELECTOR PLAN 3
-SBP is 140-160  -Will hold off HTN med in the setting of GI bleed
-SBP is in 130s  -will hold BP medications in setting of GI bleed

## 2021-08-25 NOTE — PROGRESS NOTE ADULT - PROBLEM SELECTOR PLAN 2
c/w PPI
-Pt with elevated HR >100 and WBC 20K  -Less likely infection but will do sepsis work up  -No need of abx  now   -Chest Xray is normal   -EKG with T wave inversion in V5 and AVL but no chest pain , SOB  - patient currently Hemodynamically stable, afebrile
- HgbA1c 7.2  -current blood glucose 161   -SSI and FS q6hr

## 2021-08-25 NOTE — PROGRESS NOTE ADULT - SUBJECTIVE AND OBJECTIVE BOX
GI PROGRESS NOTE    Patient is a 55y old  Female who presents with a chief complaint of Upper GI bleed (25 Aug 2021 07:08)      HPI:  54 yo F from home with Pmhx of HTN, DM, OA, GERD, on aspirin for 18 months, came for chief complains of hematemesis and melena. Pt states that she started noticing black stools since yesterday. So far she has 7 black bowel movements, moderate in amount, soft in consistency. Last BM 3 hours ago. She had one episode of bloody vomiting in morning. She noticed few clots , small in size, moderate amount, red- maroon in color , no food particles. PT endorses to have nausea and loss of appetite for 2 days. She has tenesmus, fatigue and weakness but denies fevers, shortness of breadth, iron pills, weight loss, colonoscopy and endoscopy in the past, spicy food intake, abdominal pain.   Pt further mentioned that she is using baking soda and also having alot of red bull drink recently.  (23 Aug 2021 18:05)          ______________________________________________________________________  PMH/PSH:  PAST MEDICAL & SURGICAL HISTORY:  HTN (hypertension)    Diabetes mellitus    OA (osteoarthritis)    GERD (gastroesophageal reflux disease)    S/P cholecystectomy      ______________________________________________________________________  MEDS:  MEDICATIONS  (STANDING):  budesonide 160 MICROgram(s)/formoterol 4.5 MICROgram(s) Inhaler 2 Puff(s) Inhalation two times a day  escitalopram 10 milliGRAM(s) Oral daily  gabapentin 100 milliGRAM(s) Oral two times a day  gemfibrozil 600 milliGRAM(s) Oral two times a day  insulin lispro (ADMELOG) corrective regimen sliding scale   SubCutaneous every 6 hours  lactated ringers. 1000 milliLiter(s) (100 mL/Hr) IV Continuous <Continuous>  nicotine -  14 mG/24Hr(s) Patch 1 patch Transdermal daily  pantoprazole  Injectable 40 milliGRAM(s) IV Push every 12 hours  potassium phosphate IVPB 15 milliMole(s) IV Intermittent once    MEDICATIONS  (PRN):    ______________________________________________________________________  ALL:   Allergies    No Known Allergies    Intolerances      ______________________________________________________________________  SH: Social History:  Smokes 1PPD for 40 yrs, denies etoh abuse and illicit drug abuse (23 Aug 2021 18:05)    ______________________________________________________________________  FH:  FAMILY HISTORY:    ______________________________________________________________________  ROS:    CONSTITUTIONAL:  No weight loss, fever, chills, weakness or fatigue.    HEENT:  Eyes:  No visual loss, blurred vision, double vision or yellow sclerae. Ears, Nose, Throat:  No hearing loss, sneezing, congestion, runny nose or sore throat.    SKIN:  No rash or itching.    CARDIOVASCULAR:  No chest pain, chest pressure or chest discomfort. No palpitations or edema.    RESPIRATORY:  No shortness of breath, cough or sputum.    GASTROINTESTINAL:  SEE HPI    GENITOURINARY:  No dysuria, hematuria, urinary frequency    NEUROLOGICAL:  No headache, dizziness, syncope, paralysis, ataxia, numbness or tingling in the extremities. No change in bowel or bladder control.    MUSCULOSKELETAL:  No muscle, back pain, joint pain or stiffness.    HEMATOLOGIC:  No anemia, bleeding or bruising.    LYMPHATICS:  No enlarged nodes. No history of splenectomy.    PSYCHIATRIC:  No history of depression or anxiety.    ENDOCRINOLOGIC:  No reports of sweating, cold or heat intolerance. No polyuria or polydipsia.    ALLERGIES:  No history of asthma, hives, eczema or rhinitis.  ______________________________________________________________________  PHYSICAL EXAM:  T(C): 36.8 (08-25-21 @ 11:04), Max: 36.9 (08-24-21 @ 12:40)  HR: 79 (08-25-21 @ 11:04)  BP: 130/71 (08-25-21 @ 11:04)  RR: 18 (08-25-21 @ 11:04)  SpO2: 97% (08-25-21 @ 11:04)  Wt(kg): --    08-24 - 08-25  --------------------------------------------------------  IN:    Oral Fluid: 300 mL  Total IN: 300 mL    OUT:  Total OUT: 0 mL    Total NET: 300 mL    GI HPI - Patient seen lying in bed NAD. Reports feeling better. S/P EGD yesterday. Denies nausea or vomiting. Tolerating liquid diet. Reports small amount of dark stool this am. Guamanian  # [id number]      GEN: NAD   CVS- S1 S2  ABD: soft nontender, non distended, bowel sounds+  LUNGS: clear to auscultation  NEURO: non focal neuro exam; Extremities: no cyanosis, no calf tenderness, no edema, no clubbing      ______________________________________________________________________  LABS:                        7.3    13.43 )-----------( 441      ( 25 Aug 2021 08:16 )             21.4     08-25    141  |  108  |  13  ----------------------------<  141<H>  3.7   |  27  |  0.59    Ca    8.5      25 Aug 2021 08:16  Phos  2.4     08-25  Mg     2.2     08-25    TPro  6.1  /  Alb  3.4<L>  /  TBili  0.3  /  DBili  x   /  AST  41<H>  /  ALT  44  /  AlkPhos  62  08-25    LIVER FUNCTIONS - ( 25 Aug 2021 08:16 )  Alb: 3.4 g/dL / Pro: 6.1 g/dL / ALK PHOS: 62 U/L / ALT: 44 U/L DA / AST: 41 U/L / GGT: x           ______________________________________________________________________

## 2021-08-26 ENCOUNTER — TRANSCRIPTION ENCOUNTER (OUTPATIENT)
Age: 56
End: 2021-08-26

## 2021-08-26 VITALS
RESPIRATION RATE: 17 BRPM | HEART RATE: 96 BPM | OXYGEN SATURATION: 99 % | SYSTOLIC BLOOD PRESSURE: 154 MMHG | TEMPERATURE: 98 F | DIASTOLIC BLOOD PRESSURE: 71 MMHG

## 2021-08-26 LAB
ALBUMIN SERPL ELPH-MCNC: 3.4 G/DL — LOW (ref 3.5–5)
ALP SERPL-CCNC: 68 U/L — SIGNIFICANT CHANGE UP (ref 40–120)
ALT FLD-CCNC: 49 U/L DA — SIGNIFICANT CHANGE UP (ref 10–60)
ANION GAP SERPL CALC-SCNC: 6 MMOL/L — SIGNIFICANT CHANGE UP (ref 5–17)
AST SERPL-CCNC: 35 U/L — SIGNIFICANT CHANGE UP (ref 10–40)
BILIRUB SERPL-MCNC: 0.3 MG/DL — SIGNIFICANT CHANGE UP (ref 0.2–1.2)
BUN SERPL-MCNC: 9 MG/DL — SIGNIFICANT CHANGE UP (ref 7–18)
CALCIUM SERPL-MCNC: 8.9 MG/DL — SIGNIFICANT CHANGE UP (ref 8.4–10.5)
CHLORIDE SERPL-SCNC: 108 MMOL/L — SIGNIFICANT CHANGE UP (ref 96–108)
CO2 SERPL-SCNC: 26 MMOL/L — SIGNIFICANT CHANGE UP (ref 22–31)
CREAT SERPL-MCNC: 0.62 MG/DL — SIGNIFICANT CHANGE UP (ref 0.5–1.3)
GLUCOSE BLDC GLUCOMTR-MCNC: 157 MG/DL — HIGH (ref 70–99)
GLUCOSE BLDC GLUCOMTR-MCNC: 179 MG/DL — HIGH (ref 70–99)
GLUCOSE SERPL-MCNC: 143 MG/DL — HIGH (ref 70–99)
HCT VFR BLD CALC: 22.6 % — LOW (ref 34.5–45)
HGB BLD-MCNC: 7.6 G/DL — LOW (ref 11.5–15.5)
MAGNESIUM SERPL-MCNC: 2.4 MG/DL — SIGNIFICANT CHANGE UP (ref 1.6–2.6)
MCHC RBC-ENTMCNC: 32.1 PG — SIGNIFICANT CHANGE UP (ref 27–34)
MCHC RBC-ENTMCNC: 33.6 GM/DL — SIGNIFICANT CHANGE UP (ref 32–36)
MCV RBC AUTO: 95.4 FL — SIGNIFICANT CHANGE UP (ref 80–100)
NRBC # BLD: 0 /100 WBCS — SIGNIFICANT CHANGE UP (ref 0–0)
PHOSPHATE SERPL-MCNC: 3.7 MG/DL — SIGNIFICANT CHANGE UP (ref 2.5–4.5)
PLATELET # BLD AUTO: 465 K/UL — HIGH (ref 150–400)
POTASSIUM SERPL-MCNC: 3.7 MMOL/L — SIGNIFICANT CHANGE UP (ref 3.5–5.3)
POTASSIUM SERPL-SCNC: 3.7 MMOL/L — SIGNIFICANT CHANGE UP (ref 3.5–5.3)
PROT SERPL-MCNC: 6.2 G/DL — SIGNIFICANT CHANGE UP (ref 6–8.3)
RBC # BLD: 2.37 M/UL — LOW (ref 3.8–5.2)
RBC # FLD: 14.2 % — SIGNIFICANT CHANGE UP (ref 10.3–14.5)
SODIUM SERPL-SCNC: 140 MMOL/L — SIGNIFICANT CHANGE UP (ref 135–145)
WBC # BLD: 10.54 K/UL — HIGH (ref 3.8–10.5)
WBC # FLD AUTO: 10.54 K/UL — HIGH (ref 3.8–10.5)

## 2021-08-26 PROCEDURE — 93005 ELECTROCARDIOGRAM TRACING: CPT

## 2021-08-26 PROCEDURE — 82272 OCCULT BLD FECES 1-3 TESTS: CPT

## 2021-08-26 PROCEDURE — 71045 X-RAY EXAM CHEST 1 VIEW: CPT

## 2021-08-26 PROCEDURE — 99232 SBSQ HOSP IP/OBS MODERATE 35: CPT

## 2021-08-26 PROCEDURE — 85730 THROMBOPLASTIN TIME PARTIAL: CPT

## 2021-08-26 PROCEDURE — 36415 COLL VENOUS BLD VENIPUNCTURE: CPT

## 2021-08-26 PROCEDURE — P9037: CPT

## 2021-08-26 PROCEDURE — 87635 SARS-COV-2 COVID-19 AMP PRB: CPT

## 2021-08-26 PROCEDURE — 80061 LIPID PANEL: CPT

## 2021-08-26 PROCEDURE — 96374 THER/PROPH/DIAG INJ IV PUSH: CPT

## 2021-08-26 PROCEDURE — 36430 TRANSFUSION BLD/BLD COMPNT: CPT

## 2021-08-26 PROCEDURE — 85027 COMPLETE CBC AUTOMATED: CPT

## 2021-08-26 PROCEDURE — 82962 GLUCOSE BLOOD TEST: CPT

## 2021-08-26 PROCEDURE — 86769 SARS-COV-2 COVID-19 ANTIBODY: CPT

## 2021-08-26 PROCEDURE — 86900 BLOOD TYPING SEROLOGIC ABO: CPT

## 2021-08-26 PROCEDURE — 85610 PROTHROMBIN TIME: CPT

## 2021-08-26 PROCEDURE — 80053 COMPREHEN METABOLIC PANEL: CPT

## 2021-08-26 PROCEDURE — 94640 AIRWAY INHALATION TREATMENT: CPT

## 2021-08-26 PROCEDURE — 83690 ASSAY OF LIPASE: CPT

## 2021-08-26 PROCEDURE — P9100: CPT

## 2021-08-26 PROCEDURE — 85025 COMPLETE CBC W/AUTO DIFF WBC: CPT

## 2021-08-26 PROCEDURE — 87338 HPYLORI STOOL AG IA: CPT

## 2021-08-26 PROCEDURE — 99291 CRITICAL CARE FIRST HOUR: CPT | Mod: 25

## 2021-08-26 PROCEDURE — 84100 ASSAY OF PHOSPHORUS: CPT

## 2021-08-26 PROCEDURE — 99239 HOSP IP/OBS DSCHRG MGMT >30: CPT

## 2021-08-26 PROCEDURE — 81001 URINALYSIS AUTO W/SCOPE: CPT

## 2021-08-26 PROCEDURE — 86923 COMPATIBILITY TEST ELECTRIC: CPT

## 2021-08-26 PROCEDURE — 83036 HEMOGLOBIN GLYCOSYLATED A1C: CPT

## 2021-08-26 PROCEDURE — 74174 CTA ABD&PLVS W/CONTRAST: CPT | Mod: MA

## 2021-08-26 PROCEDURE — 84484 ASSAY OF TROPONIN QUANT: CPT

## 2021-08-26 PROCEDURE — 82550 ASSAY OF CK (CPK): CPT

## 2021-08-26 PROCEDURE — 83735 ASSAY OF MAGNESIUM: CPT

## 2021-08-26 PROCEDURE — P9040: CPT

## 2021-08-26 PROCEDURE — 86901 BLOOD TYPING SEROLOGIC RH(D): CPT

## 2021-08-26 PROCEDURE — 87040 BLOOD CULTURE FOR BACTERIA: CPT

## 2021-08-26 PROCEDURE — 86850 RBC ANTIBODY SCREEN: CPT

## 2021-08-26 RX ORDER — PANTOPRAZOLE SODIUM 20 MG/1
40 TABLET, DELAYED RELEASE ORAL
Refills: 0 | Status: DISCONTINUED | OUTPATIENT
Start: 2021-08-26 | End: 2021-08-26

## 2021-08-26 RX ORDER — FERROUS SULFATE 325(65) MG
1 TABLET ORAL
Qty: 30 | Refills: 0
Start: 2021-08-26 | End: 2021-09-24

## 2021-08-26 RX ORDER — PANTOPRAZOLE SODIUM 20 MG/1
1 TABLET, DELAYED RELEASE ORAL
Qty: 60 | Refills: 0
Start: 2021-08-26 | End: 2021-09-24

## 2021-08-26 RX ADMIN — Medication 1: at 08:15

## 2021-08-26 RX ADMIN — PANTOPRAZOLE SODIUM 40 MILLIGRAM(S): 20 TABLET, DELAYED RELEASE ORAL at 06:49

## 2021-08-26 RX ADMIN — Medication 600 MILLIGRAM(S): at 06:48

## 2021-08-26 RX ADMIN — Medication 1: at 12:19

## 2021-08-26 RX ADMIN — GABAPENTIN 100 MILLIGRAM(S): 400 CAPSULE ORAL at 06:48

## 2021-08-26 NOTE — PROGRESS NOTE ADULT - SUBJECTIVE AND OBJECTIVE BOX
PGY1 Progress Note discussed with attending     PAGER #: [[phone]] TILL 5:00 PM  PLEASE CONTACT ON CALL TEAM:  - On Call Team (Please refer to Jessica) FROM 5:00 PM - 8:30PM  - Nightfloat Team FROM 8:30 -7:30 AM    CHIEF COMPLAINT & BRIEF HOSPITAL COURSE:    INTERVAL HPI/OVERNIGHT EVENTS:       REVIEW OF SYSTEMS:  CONSTITUTIONAL: No fever, weight loss, or fatigue  RESPIRATORY: No cough, wheezing, chills or hemoptysis; No shortness of breath  CARDIOVASCULAR: No chest pain, palpitations, dizziness, or leg swelling  GASTROINTESTINAL: No abdominal pain. No nausea, vomiting, or hematemesis; No diarrhea or constipation. No melena or hematochezia.  GENITOURINARY: No dysuria or hematuria, urinary frequency  NEUROLOGICAL: No headaches, memory loss, loss of strength, numbness, or tremors  SKIN: No itching, burning, rashes, or lesions     Vital Signs Last 24 Hrs  T(C): 36.8 (26 Aug 2021 07:32), Max: 36.9 (25 Aug 2021 23:37)  T(F): 98.2 (26 Aug 2021 07:32), Max: 98.4 (25 Aug 2021 23:37)  HR: 76 (26 Aug 2021 07:32) (76 - 98)  BP: 118/59 (26 Aug 2021 07:32) (114/74 - 134/74)  BP(mean): --  RR: 18 (26 Aug 2021 07:32) (18 - 20)  SpO2: 100% (26 Aug 2021 07:32) (97% - 100%)    PHYSICAL EXAMINATION:  GENERAL: NAD, well built  HEAD:  Atraumatic, Normocephalic  EYES:  conjunctiva and sclera clear  NECK: Supple, No JVD, Normal thyroid  CHEST/LUNG: Clear to auscultation. Clear to percussion bilaterally; No rales, rhonchi, wheezing, or rubs  HEART: Regular rate and rhythm; No murmurs, rubs, or gallops  ABDOMEN: Soft, Nontender, Nondistended; Bowel sounds present  NERVOUS SYSTEM:  Alert & Oriented X3,    EXTREMITIES:  2+ Peripheral Pulses, No clubbing, cyanosis, or edema  SKIN: warm dry                          7.3    13.43 )-----------( 441      ( 25 Aug 2021 08:16 )             21.4     08-25    141  |  108  |  13  ----------------------------<  141<H>  3.7   |  27  |  0.59    Ca    8.5      25 Aug 2021 08:16  Phos  2.4     08-25  Mg     2.2     08-25    TPro  6.1  /  Alb  3.4<L>  /  TBili  0.3  /  DBili  x   /  AST  41<H>  /  ALT  44  /  AlkPhos  62  08-25    LIVER FUNCTIONS - ( 25 Aug 2021 08:16 )  Alb: 3.4 g/dL / Pro: 6.1 g/dL / ALK PHOS: 62 U/L / ALT: 44 U/L DA / AST: 41 U/L / GGT: x                   CAPILLARY BLOOD GLUCOSE      RADIOLOGY & ADDITIONAL TESTS:                   PGY1 Progress Note discussed with attending     PAGER #: [[phone]] TILL 5:00 PM  PLEASE CONTACT ON CALL TEAM:  - On Call Team (Please refer to Jessica) FROM 5:00 PM - 8:30PM  - Nightfloat Team FROM 8:30 -7:30 AM    CHIEF COMPLAINT & BRIEF HOSPITAL COURSE:    INTERVAL HPI/OVERNIGHT EVENTS: No acute events overnight. Patient denies any emesis, melena or hematochezia.       REVIEW OF SYSTEMS:  CONSTITUTIONAL: No fever, weight loss, or fatigue  RESPIRATORY: No cough, wheezing, chills or hemoptysis; No shortness of breath  CARDIOVASCULAR: No chest pain, palpitations, dizziness, or leg swelling  GASTROINTESTINAL: No abdominal pain. No nausea, vomiting, or hematemesis; No diarrhea or constipation. No melena or hematochezia.  GENITOURINARY: No dysuria or hematuria, urinary frequency  NEUROLOGICAL: No headaches, memory loss, loss of strength, numbness, or tremors  SKIN: No itching, burning, rashes, or lesions     Vital Signs Last 24 Hrs  T(C): 36.8 (26 Aug 2021 07:32), Max: 36.9 (25 Aug 2021 23:37)  T(F): 98.2 (26 Aug 2021 07:32), Max: 98.4 (25 Aug 2021 23:37)  HR: 76 (26 Aug 2021 07:32) (76 - 98)  BP: 118/59 (26 Aug 2021 07:32) (114/74 - 134/74)  BP(mean): --  RR: 18 (26 Aug 2021 07:32) (18 - 20)  SpO2: 100% (26 Aug 2021 07:32) (97% - 100%)    PHYSICAL EXAMINATION:  GENERAL: NAD, well built  HEAD:  Atraumatic, Normocephalic  EYES:  conjunctiva and sclera clear  NECK: Supple, No JVD, Normal thyroid  CHEST/LUNG: Clear to auscultation. No rales, rhonchi, wheezing, or rubs  HEART: Regular rate and rhythm; No murmurs, rubs, or gallops  ABDOMEN: Soft, Nontender, Nondistended; Bowel sounds present  NERVOUS SYSTEM:  Alert & Oriented X3,    EXTREMITIES:  2+ Peripheral Pulses, No clubbing, cyanosis, or edema  SKIN: warm dry                          7.3    13.43 )-----------( 441      ( 25 Aug 2021 08:16 )             21.4     08-25    141  |  108  |  13  ----------------------------<  141<H>  3.7   |  27  |  0.59    Ca    8.5      25 Aug 2021 08:16  Phos  2.4     08-25  Mg     2.2     08-25    TPro  6.1  /  Alb  3.4<L>  /  TBili  0.3  /  DBili  x   /  AST  41<H>  /  ALT  44  /  AlkPhos  62  08-25    LIVER FUNCTIONS - ( 25 Aug 2021 08:16 )  Alb: 3.4 g/dL / Pro: 6.1 g/dL / ALK PHOS: 62 U/L / ALT: 44 U/L DA / AST: 41 U/L / GGT: x                   CAPILLARY BLOOD GLUCOSE      RADIOLOGY & ADDITIONAL TESTS:

## 2021-08-26 NOTE — CHART NOTE - NSCHARTNOTEFT_GEN_A_CORE
Repeat CBC  Hemo: 7.3 > 7.4  Hematocrit: 21.2 > 20.7    Repeat in a.m.  Hold transfusion  Type and screen still valid
Patient seen and examined.  Case discussed with house staff.    Patient interviewed with Surinamese , ID #[id number]  Patient for discharge today. See d/c note.  Patient is a 55yoF with h/o HTN, DM II, OA, GERD a/w acute blood loss anemia 2/2 Upper GI bleed.  Acute blood loss anemia 2/2 Upper GI bleed 2/2 Gastric ulcer- Patient s/p EGD with clips placed and hemostasis achieved. Patient is s/p 2u pRBCs and 1 u platelets during admission.  H/h has since stabilized. Patient has been counseled to stop ASA and meloxicam and to avoid NSAIDs.  Tolerating diet  d/c home today.  SIRS - Noninfectious SIRS in setting of GI bleed. Sepsis and infection are ruled out  HTN - Resume labetolol. Hold ARB/HCT combo until outpt BP check  DM - Resume oral hypoglycemics on d/c.   COPD - c/w symbicort  Plan d/w Patient  D/C time: 40 minutes

## 2021-08-26 NOTE — PROGRESS NOTE ADULT - ATTENDING COMMENTS
Patient seen and examined. Doing well today. Tolerating liquids well. Hb has been stable. No ongoing overt bleeding. Can advance diet to regular. Continue Pantoprazole 40mg twice daily. Would add iron tabs BID for 30 days. Recommend repeat EGD in 2-3 months to confirm ulcer healing, she can follow-up in my office in 2-4 weeks beforehand. Avoid NSAIDS.

## 2021-08-26 NOTE — PROGRESS NOTE ADULT - PROBLEM SELECTOR PLAN 1
hgb 7.6 this am  c/w PPI  repeat EGD in 2-3 months to ensure ulcer healing  patient also in need of screening colonoscopy since she is 55 and denies ever having a colonoscopy  outpatient f/u with Dr. Gaviria at [address] [place] [address] hgb 7.6 this am  c/w PPI  avoid advil and other NSAID's  repeat EGD in 2-3 months to ensure ulcer healing  patient also in need of screening colonoscopy since she is 55 and denies ever having a colonoscopy  outpatient f/u with Dr. Gaviria at [address] [place] [address]

## 2021-08-26 NOTE — PROGRESS NOTE ADULT - SUBJECTIVE AND OBJECTIVE BOX
GI PROGRESS NOTE    Patient is a 55y old  Female who presents with a chief complaint of Upper GI bleed (26 Aug 2021 07:39)      HPI:  56 yo F from home with Pmhx of HTN, DM, OA, GERD, on aspirin for 18 months, came for chief complains of hematemesis and melena. Pt states that she started noticing black stools since yesterday. So far she has 7 black bowel movements, moderate in amount, soft in consistency. Last BM 3 hours ago. She had one episode of bloody vomiting in morning. She noticed few clots , small in size, moderate amount, red- maroon in color , no food particles. PT endorses to have nausea and loss of appetite for 2 days. She has tenesmus, fatigue and weakness but denies fevers, shortness of breadth, iron pills, weight loss, colonoscopy and endoscopy in the past, spicy food intake, abdominal pain.   Pt further mentioned that she is using baking soda and also having alot of red bull drink recently.  (23 Aug 2021 18:05)          ______________________________________________________________________  PMH/PSH:  PAST MEDICAL & SURGICAL HISTORY:  HTN (hypertension)    Diabetes mellitus    OA (osteoarthritis)    GERD (gastroesophageal reflux disease)    S/P cholecystectomy      ______________________________________________________________________  MEDS:  MEDICATIONS  (STANDING):  budesonide 160 MICROgram(s)/formoterol 4.5 MICROgram(s) Inhaler 2 Puff(s) Inhalation two times a day  escitalopram 10 milliGRAM(s) Oral daily  gabapentin 100 milliGRAM(s) Oral two times a day  gemfibrozil 600 milliGRAM(s) Oral two times a day  insulin lispro (ADMELOG) corrective regimen sliding scale   SubCutaneous every 6 hours  lactated ringers. 1000 milliLiter(s) (100 mL/Hr) IV Continuous <Continuous>  nicotine -  14 mG/24Hr(s) Patch 1 patch Transdermal daily  pantoprazole    Tablet 40 milliGRAM(s) Oral two times a day    MEDICATIONS  (PRN):    ______________________________________________________________________  ALL:   Allergies    No Known Allergies    Intolerances      ______________________________________________________________________  SH: Social History:  Smokes 1PPD for 40 yrs, denies etoh abuse and illicit drug abuse (23 Aug 2021 18:05)    ______________________________________________________________________  FH:  FAMILY HISTORY:    ______________________________________________________________________  ROS:    CONSTITUTIONAL:  No weight loss, fever, chills, weakness or fatigue.    HEENT:  Eyes:  No visual loss, blurred vision, double vision or yellow sclerae. Ears, Nose, Throat:  No hearing loss, sneezing, congestion, runny nose or sore throat.    SKIN:  No rash or itching.    CARDIOVASCULAR:  No chest pain, chest pressure or chest discomfort. No palpitations or edema.    RESPIRATORY:  No shortness of breath, cough or sputum.    GASTROINTESTINAL:  SEE HPI    GENITOURINARY:  No dysuria, hematuria, urinary frequency    NEUROLOGICAL:  No headache, dizziness, syncope, paralysis, ataxia, numbness or tingling in the extremities. No change in bowel or bladder control.    MUSCULOSKELETAL:  No muscle, back pain, joint pain or stiffness.    HEMATOLOGIC:  No anemia, bleeding or bruising.    LYMPHATICS:  No enlarged nodes. No history of splenectomy.    PSYCHIATRIC:  No history of depression or anxiety.    ENDOCRINOLOGIC:  No reports of sweating, cold or heat intolerance. No polyuria or polydipsia.    ALLERGIES:  No history of asthma, hives, eczema or rhinitis.    GI HPI: Patient seen an examined sitting OOB-Chair eating regular breakfast. No nausea, no vomiting. Reports watery BM this am but denies melena or bright red blood. No abdominal pain. Denies chest pain, dizziness, SOB or palpitations. States she feels better and wants to go home.      ______________________________________________________________________  PHYSICAL EXAM:  T(C): 36.8 (08-26-21 @ 07:32), Max: 36.9 (08-25-21 @ 23:37)  HR: 76 (08-26-21 @ 07:32)  BP: 118/59 (08-26-21 @ 07:32)  RR: 18 (08-26-21 @ 07:32)  SpO2: 100% (08-26-21 @ 07:32)  Wt(kg): --    08-25 - 08-26  --------------------------------------------------------  IN:    IV PiggyBack: 250 mL    Oral Fluid: 840 mL  Total IN: 1090 mL    OUT:  Total OUT: 0 mL    Total NET: 1090 mL          GEN: NAD   CVS- S1 S2  ABD: soft nontender, non distended, bowel sounds+  LUNGS: clear to auscultation  NEURO: non focal neuro exam; Extremities: no cyanosis, no calf tenderness, no edema, no clubbing      ______________________________________________________________________  LABS:                        7.6    10.54 )-----------( 465      ( 26 Aug 2021 08:35 )             22.6     08-26    140  |  108  |  9   ----------------------------<  143<H>  3.7   |  26  |  0.62    Ca    8.9      26 Aug 2021 08:35  Phos  3.7     08-26  Mg     2.4     08-26    TPro  6.2  /  Alb  3.4<L>  /  TBili  0.3  /  DBili  x   /  AST  35  /  ALT  49  /  AlkPhos  68  08-26    LIVER FUNCTIONS - ( 26 Aug 2021 08:35 )  Alb: 3.4 g/dL / Pro: 6.2 g/dL / ALK PHOS: 68 U/L / ALT: 49 U/L DA / AST: 35 U/L / GGT: x

## 2021-08-26 NOTE — PROGRESS NOTE ADULT - PROVIDER SPECIALTY LIST ADULT
Gastroenterology
Internal Medicine
Gastroenterology
Gastroenterology

## 2021-08-26 NOTE — DISCHARGE NOTE NURSING/CASE MANAGEMENT/SOCIAL WORK - PATIENT PORTAL LINK FT
You can access the FollowMyHealth Patient Portal offered by [facility] by registering at the following website: [email or web address]. By joining [email or web address]’s FollowMyHealth portal, you will also be able to view your health information using other applications (apps) compatible with our system.

## 2021-08-26 NOTE — DISCHARGE NOTE PROVIDER - HOSPITAL COURSE
55 year old F with PMH OA, DM, GERD presented with hematemesis and melena likely 2/2 PUD. Patient on arrival was hemodynamically stable. Hgb 7.1 on admission, written for 1 unit PRBC and 1L fluid bolus. Guiac positive. Had aspirin recently was also given 1 unit platelet in the ED. CT angio pending. On 8/23, a 55 year old F with PMH OA, DM, GERD presented with hematemesis and melena likely. Patient on arrival was hemodynamically stable. Hgb 7.1 on admission, given 1 unit PRBC and 1L fluid bolus. Guiac positive. Had aspirin recently was also given 1 unit platelet in the ED. CT angio was negative for any acute GI hemorrhage. Patient was started on IV protonix drip and Gastroenterology was consulted. After transfusion patient Hgb was 7.7, however further Hgb studies showed Hgb of 6.7.  On 8/24 patient was given 2 units of PRBCs and repeat Hgb was 7.8 Patient underwent EGD and findings were significant for a Pyloric channel ulcer with large pulsating vessel. Complete hemostasis achieved using cautery and one over-the-scope clip. Patient was then transitioned to clear liquid diet and IV protonix BID.   Patient remained stable on 8/25 with Hgb trending 7.2, 7.3 and currently 7.6. Patient is stable for discharge.    Patient is a 55 year old F with a PMH OA, DM, GERD who presented with hematemesis and melena. On arrival, patient was hemodynamically stable with a Hgb of 7.1. Patient was transfused 1unit pRBCs and given 1L fluid bolus. Found to be Guaiac positive. Reports recent aspirin use and was given 1 unit platelet in the ED. CT angio was negative for any acute GI hemorrhage. Patient was started on IV protonix drip and Gastroenterology was consulted. After transfusion patient Hgb was 7.7, however further Hgb studies showed Hgb of 6.7.  On 8/24 patient was given 2 units of PRBCs and repeat Hgb was 7.8 Patient underwent EGD and findings were significant for a Pyloric channel ulcer with large pulsating vessel. Complete hemostasis achieved using cautery and one over-the-scope clip. Patient was then transitioned to clear liquid diet and IV protonix BID, and diet advanced.      Patient remained stable on 8/25 with Hgb trending 7.2, 7.3 and currently 7.6. Patient is stable for discharge.

## 2021-08-26 NOTE — DISCHARGE NOTE PROVIDER - PROVIDER TOKENS
PROVIDER:[TOKEN:[[id number]],FOLLOWUP:[1 month]],PROVIDER:[TOKEN:[[id number]],FOLLOWUP:[2 weeks]]

## 2021-08-26 NOTE — DISCHARGE NOTE PROVIDER - NSDCMRMEDTOKEN_GEN_ALL_CORE_FT
aspirin 81 mg oral tablet, chewable: 1 tab(s) orally once a day  gabapentin 100 mg oral tablet: 100 milligram(s) orally 2 times a day  gemfibrozil 600 mg oral tablet: 1 tab(s) orally 2 times a day  glimepiride 2 mg oral tablet: 2 milligram(s) orally 2 times a day  labetalol 200 mg oral tablet: 1 tab(s) orally 2 times a day  Lexapro 10 mg oral tablet: 1 tab(s) orally once a day  losartan-hydrochlorothiazide 100 mg-25 mg oral tablet: 1 tab(s) orally once a day  meloxicam 15 mg oral tablet: 1 tab(s) orally once a day  metFORMIN 500 mg oral tablet: 1 tab(s) orally 2 times a day  Symbicort: 250 milligram(s) inhaled 2 times a day  Vitamin D2 1.25 mg (50,000 intl units) oral capsule: 1 cap(s) orally once a week   gabapentin 100 mg oral tablet: 100 milligram(s) orally 2 times a day  gemfibrozil 600 mg oral tablet: 1 tab(s) orally 2 times a day  glimepiride 2 mg oral tablet: 2 milligram(s) orally 2 times a day  labetalol 200 mg oral tablet: 1 tab(s) orally 2 times a day  Lexapro 10 mg oral tablet: 1 tab(s) orally once a day  metFORMIN 500 mg oral tablet: 1 tab(s) orally 2 times a day  pantoprazole 40 mg oral delayed release tablet: 1 tab(s) orally 2 times a day  Symbicort: 250 milligram(s) inhaled 2 times a day  Vitamin D2 1.25 mg (50,000 intl units) oral capsule: 1 cap(s) orally once a week   ferrous sulfate 325 mg (65 mg elemental iron) oral delayed release tablet: 1 tab(s) orally once a day   gabapentin 100 mg oral tablet: 100 milligram(s) orally 2 times a day  gemfibrozil 600 mg oral tablet: 1 tab(s) orally 2 times a day  glimepiride 2 mg oral tablet: 2 milligram(s) orally 2 times a day  labetalol 200 mg oral tablet: 1 tab(s) orally 2 times a day  Lexapro 10 mg oral tablet: 1 tab(s) orally once a day  metFORMIN 500 mg oral tablet: 1 tab(s) orally 2 times a day  pantoprazole 40 mg oral delayed release tablet: 1 tab(s) orally 2 times a day  Symbicort: 250 milligram(s) inhaled 2 times a day  Vitamin D2 1.25 mg (50,000 intl units) oral capsule: 1 cap(s) orally once a week

## 2021-08-26 NOTE — DISCHARGE NOTE PROVIDER - CARE PROVIDER_API CALL
Paco Gaviria)  Gastroenterology; Internal Medicine  [address] [place] [id number]  Phone: [phone]  Fax: [phone]  Follow Up Time: 1 month    Yobany Faulkner [facility]  [address], [address]  [place] [address]  Phone: [phone]  Fax: [phone]  Follow Up Time: 2 weeks

## 2021-08-26 NOTE — DISCHARGE NOTE NURSING/CASE MANAGEMENT/SOCIAL WORK - NSDCPEFALRISK_GEN_ALL_CORE
For information on Fall & injury Prevention, visit [email or web address]

## 2021-08-29 LAB
CULTURE RESULTS: SIGNIFICANT CHANGE UP
CULTURE RESULTS: SIGNIFICANT CHANGE UP
SPECIMEN SOURCE: SIGNIFICANT CHANGE UP
SPECIMEN SOURCE: SIGNIFICANT CHANGE UP

## 2021-08-30 LAB — H PYLORI AG STL QL: DETECTED

## 2021-09-08 ENCOUNTER — APPOINTMENT (OUTPATIENT)
Dept: GASTROENTEROLOGY | Facility: CLINIC | Age: 56
End: 2021-09-08
Payer: COMMERCIAL

## 2021-09-08 VITALS
WEIGHT: 231 LBS | HEART RATE: 92 BPM | TEMPERATURE: 97.2 F | DIASTOLIC BLOOD PRESSURE: 81 MMHG | OXYGEN SATURATION: 97 % | HEIGHT: 67 IN | SYSTOLIC BLOOD PRESSURE: 148 MMHG | BODY MASS INDEX: 36.26 KG/M2

## 2021-09-08 PROBLEM — Z00.00 ENCOUNTER FOR PREVENTIVE HEALTH EXAMINATION: Noted: 2021-09-08

## 2021-09-08 PROCEDURE — 99213 OFFICE O/P EST LOW 20 MIN: CPT

## 2021-09-08 RX ORDER — METRONIDAZOLE 250 MG/1
250 TABLET ORAL EVERY 6 HOURS
Qty: 56 | Refills: 0 | Status: ACTIVE | COMMUNITY
Start: 2021-09-08 | End: 1900-01-01

## 2021-09-08 RX ORDER — DOXYCYCLINE HYCLATE 100 MG/1
100 CAPSULE ORAL
Qty: 28 | Refills: 0 | Status: ACTIVE | COMMUNITY
Start: 2021-09-08 | End: 1900-01-01

## 2021-09-08 RX ORDER — BISMUTH SUBSALICYLATE 262 MG/1
262 TABLET, CHEWABLE ORAL 4 TIMES DAILY
Qty: 56 | Refills: 0 | Status: ACTIVE | COMMUNITY
Start: 2021-09-08 | End: 1900-01-01

## 2021-09-08 RX ORDER — POLYETHYLENE GLYCOL-3350 AND ELECTROLYTES WITH FLAVOR PACK 240; 5.84; 2.98; 6.72; 22.72 G/278.26G; G/278.26G; G/278.26G; G/278.26G; G/278.26G
240 POWDER, FOR SOLUTION ORAL
Qty: 1 | Refills: 0 | Status: ACTIVE | COMMUNITY
Start: 2021-09-08 | End: 1900-01-01

## 2021-09-08 NOTE — HISTORY OF PRESENT ILLNESS
[de-identified] : 55F with pmhx of recent hospitalization for UGIB 2 weeks ago, HTN, DM, OA, GERD presenting for evaluation. Pt presented initially to [facility] w/ hematemesis and melena, I performed EGD and found a large pyloric channel ulcer with a visible vessel s/p cautery and OTSC. Bleeding stopped and was discharged. Of note, pt found to be H. pylori positive in hospital (stool Ag), not yet treated. Currently feeling well. Denies any complaints other than fatigue and epigastric discomfort. Recent bloodwork from PMD showed slightly increase in Hb (in hospital ~7, now 8.4). Denies melena, hematochezia, n/v/d/c, fever/chills, or other issues. \par \par PMHx: Above.\par Medications: See chart.\par Allergies: NKA.\par Surgical Hx: Cholecystectomy. \par SH: Denies tobacco, etoh, or drug use.\par FH: Denies fhx of GI disorder.

## 2021-09-08 NOTE — PHYSICAL EXAM
[General Appearance - Alert] : alert [General Appearance - In No Acute Distress] : in no acute distress [Sclera] : the sclera and conjunctiva were normal [PERRL With Normal Accommodation] : pupils were equal in size, round, and reactive to light [Extraocular Movements] : extraocular movements were intact [Outer Ear] : the ears and nose were normal in appearance [Oropharynx] : the oropharynx was normal [Neck Appearance] : the appearance of the neck was normal [Jugular Venous Distention Increased] : there was no jugular-venous distention [Auscultation Breath Sounds / Voice Sounds] : lungs were clear to auscultation bilaterally [Heart Rate And Rhythm] : heart rate was normal and rhythm regular [Heart Sounds] : normal S1 and S2 [Heart Sounds Gallop] : no gallops [Murmurs] : no murmurs [Heart Sounds Pericardial Friction Rub] : no pericardial rub [Bowel Sounds] : normal bowel sounds [Abdomen Soft] : soft [Abdomen Tenderness] : non-tender [] : no hepato-splenomegaly [Abdomen Mass (___ Cm)] : no abdominal mass palpated [Abnormal Walk] : normal gait [Skin Color & Pigmentation] : normal skin color and pigmentation [No Focal Deficits] : no focal deficits [Oriented To Time, Place, And Person] : oriented to person, place, and time

## 2021-09-08 NOTE — ASSESSMENT
[FreeTextEntry1] : 55F with pmhx of recent hospitalization for UGIB 2 weeks ago, HTN, DM, OA, GERD presenting for evaluation. Recent hospitalization 2/2 large pyloric channel ulcer s/p cautery and OTSC. Bleeding now resolved. Hb slight uptrend (8.4) since hospitalization. Found to be H. pylori positive, not yet treated. Has never had a colonsocopy. \par - For H. pylori, ordered quadruple therapy for 14 days, instructed not to drink etoh given flagyl. \par - Will schedule colonoscopy given has never had one prior for screening and to evaluate ongoing anemia.\par - Will plan for EGD in 2 months to check ulcer healing and rebiopsy for H. pylori to ensure eradication. \par - Risks, benefits, alternatives discussed including but not limited to bleeding, perforation, anesthesia complication, etc and agreeable for procedures.

## 2021-09-19 DIAGNOSIS — Z01.818 ENCOUNTER FOR OTHER PREPROCEDURAL EXAMINATION: ICD-10-CM

## 2021-09-20 ENCOUNTER — APPOINTMENT (OUTPATIENT)
Dept: DISASTER EMERGENCY | Facility: CLINIC | Age: 56
End: 2021-09-20

## 2021-09-21 LAB — SARS-COV-2 N GENE NPH QL NAA+PROBE: NOT DETECTED

## 2021-09-23 ENCOUNTER — RESULT REVIEW (OUTPATIENT)
Age: 56
End: 2021-09-23

## 2021-09-23 ENCOUNTER — APPOINTMENT (OUTPATIENT)
Dept: GASTROENTEROLOGY | Facility: HOSPITAL | Age: 56
End: 2021-09-23

## 2021-09-23 ENCOUNTER — OUTPATIENT (OUTPATIENT)
Dept: OUTPATIENT SERVICES | Facility: HOSPITAL | Age: 56
LOS: 1 days | End: 2021-09-23
Payer: COMMERCIAL

## 2021-09-23 DIAGNOSIS — K25.9 GASTRIC ULCER, UNSPECIFIED AS ACUTE OR CHRONIC, WITHOUT HEMORRHAGE OR PERFORATION: ICD-10-CM

## 2021-09-23 DIAGNOSIS — Z90.49 ACQUIRED ABSENCE OF OTHER SPECIFIED PARTS OF DIGESTIVE TRACT: Chronic | ICD-10-CM

## 2021-09-23 LAB — GLUCOSE BLDC GLUCOMTR-MCNC: 156 MG/DL — HIGH (ref 70–99)

## 2021-09-23 PROCEDURE — 82962 GLUCOSE BLOOD TEST: CPT

## 2021-09-23 PROCEDURE — 45385 COLONOSCOPY W/LESION REMOVAL: CPT

## 2021-09-23 PROCEDURE — 88305 TISSUE EXAM BY PATHOLOGIST: CPT | Mod: 26

## 2021-09-23 PROCEDURE — 88305 TISSUE EXAM BY PATHOLOGIST: CPT

## 2021-09-23 NOTE — PROGRESS NOTE ADULT - SUBJECTIVE AND OBJECTIVE BOX
Colonoscopy Report    Indication: Screening for colorectal cancer, anemia.     Instrument #: [id number]    Anesthesia:  MAC    Consent:  Informed consent was obtained from the patient after providing any opportunity for questions    Procedure:  The colonoscope was gently passed through rectum and to the cecum identified by the ileocecal valve and appendix. Color, texture, mucosa and anatomy of the colon were carefully examined with the scope. The patient tolerated the procedure well. The bowel preparation was fair, lavaged extensively with good visualization. After completion of the examination, the patient was transferred to the recovery room.     Preparation:  NPO, bowel preparation    Findings:   Digital rectal exam: External hemorrhoids.  Colon: Few small diverticulae in sigmoid. One 6 mm polyp in the rectum. Resected and retrieved with a cold snare. No bleeding at conclusion.   Small internal hemorrhoids on retroflexion.     Date and time: 9/23/2021    EBL: Minimal.     Impression:   One 6 mm rectal polyp. Removed with cold snare.  Mild sigmoid diverticulosis.  Internal and external hemorrhoids.     Plan:    Transfer to recovery and plan for discharge home.   May resume regular diet.   Await pathology results.  Repeat colonoscopy in 5 years for surveillance pending pathology results.  Proceed with EGD at later date (already scheduled).                   Procedure Start Time: 1001  Cecum Time: 1004  Procedure End Time: 1015          Attending: Paco Gaviria MD

## 2021-09-27 PROBLEM — Z00.00 ENCOUNTER FOR PREVENTIVE HEALTH EXAMINATION: Status: ACTIVE | Noted: 2021-09-27

## 2021-09-27 LAB — SURGICAL PATHOLOGY STUDY: SIGNIFICANT CHANGE UP

## 2021-11-18 ENCOUNTER — APPOINTMENT (OUTPATIENT)
Dept: GASTROENTEROLOGY | Facility: HOSPITAL | Age: 56
End: 2021-11-18

## 2022-01-06 ENCOUNTER — RESULT REVIEW (OUTPATIENT)
Age: 57
End: 2022-01-06

## 2022-01-06 ENCOUNTER — OUTPATIENT (OUTPATIENT)
Dept: OUTPATIENT SERVICES | Facility: HOSPITAL | Age: 57
LOS: 1 days | End: 2022-01-06
Payer: COMMERCIAL

## 2022-01-06 ENCOUNTER — TRANSCRIPTION ENCOUNTER (OUTPATIENT)
Age: 57
End: 2022-01-06

## 2022-01-06 ENCOUNTER — APPOINTMENT (OUTPATIENT)
Dept: GASTROENTEROLOGY | Facility: HOSPITAL | Age: 57
End: 2022-01-06

## 2022-01-06 DIAGNOSIS — K59.00 CONSTIPATION, UNSPECIFIED: ICD-10-CM

## 2022-01-06 DIAGNOSIS — Z90.49 ACQUIRED ABSENCE OF OTHER SPECIFIED PARTS OF DIGESTIVE TRACT: Chronic | ICD-10-CM

## 2022-01-06 DIAGNOSIS — K25.9 GASTRIC ULCER, UNSPECIFIED AS ACUTE OR CHRONIC, WITHOUT HEMORRHAGE OR PERFORATION: ICD-10-CM

## 2022-01-06 LAB — GLUCOSE BLDC GLUCOMTR-MCNC: 267 MG/DL — HIGH (ref 70–99)

## 2022-01-06 PROCEDURE — 88305 TISSUE EXAM BY PATHOLOGIST: CPT

## 2022-01-06 PROCEDURE — 88312 SPECIAL STAINS GROUP 1: CPT

## 2022-01-06 PROCEDURE — 88312 SPECIAL STAINS GROUP 1: CPT | Mod: 26

## 2022-01-06 PROCEDURE — 88305 TISSUE EXAM BY PATHOLOGIST: CPT | Mod: 26

## 2022-01-06 PROCEDURE — 43239 EGD BIOPSY SINGLE/MULTIPLE: CPT

## 2022-01-06 PROCEDURE — 82962 GLUCOSE BLOOD TEST: CPT

## 2022-01-06 DEVICE — CATH BALLOON DIL 6-8MM: Type: IMPLANTABLE DEVICE | Status: FUNCTIONAL

## 2022-01-06 DEVICE — RESOLUTION CLIP HEMOSTATIC DEVICE: Type: IMPLANTABLE DEVICE | Status: FUNCTIONAL

## 2022-01-06 DEVICE — CATH ESOPH DIL 9 ATM 6FR 8-10MM: Type: IMPLANTABLE DEVICE | Status: FUNCTIONAL

## 2022-01-06 DEVICE — CATH ESOPH DIL 8 ATM 6FR10-12M: Type: IMPLANTABLE DEVICE | Status: FUNCTIONAL

## 2022-01-06 RX ORDER — POLYETHYLENE GLYCOL 3350 17 G/17G
17 POWDER, FOR SOLUTION ORAL DAILY
Qty: 510 | Refills: 0 | Status: ACTIVE | COMMUNITY
Start: 2022-01-06 | End: 1900-01-01

## 2022-01-10 LAB — SURGICAL PATHOLOGY STUDY: SIGNIFICANT CHANGE UP

## 2022-07-20 RX ORDER — METFORMIN HYDROCHLORIDE 850 MG/1
1 TABLET ORAL
Qty: 0 | Refills: 0 | DISCHARGE

## 2022-07-20 RX ORDER — GLIMEPIRIDE 1 MG
2 TABLET ORAL
Qty: 0 | Refills: 0 | DISCHARGE

## 2022-07-20 RX ORDER — LOSARTAN/HYDROCHLOROTHIAZIDE 100MG-25MG
1 TABLET ORAL
Qty: 0 | Refills: 0 | DISCHARGE

## 2022-07-20 RX ORDER — ERGOCALCIFEROL 1.25 MG/1
1 CAPSULE ORAL
Qty: 0 | Refills: 0 | DISCHARGE

## 2022-07-20 RX ORDER — GEMFIBROZIL 600 MG
1 TABLET ORAL
Qty: 0 | Refills: 0 | DISCHARGE

## 2022-07-20 RX ORDER — ASPIRIN/CALCIUM CARB/MAGNESIUM 324 MG
1 TABLET ORAL
Qty: 0 | Refills: 0 | DISCHARGE

## 2022-07-20 RX ORDER — MELOXICAM 15 MG/1
1 TABLET ORAL
Qty: 0 | Refills: 0 | DISCHARGE

## 2022-07-20 RX ORDER — ESCITALOPRAM OXALATE 10 MG/1
1 TABLET, FILM COATED ORAL
Qty: 0 | Refills: 0 | DISCHARGE

## 2022-07-20 RX ORDER — BUDESONIDE AND FORMOTEROL FUMARATE DIHYDRATE 160; 4.5 UG/1; UG/1
250 AEROSOL RESPIRATORY (INHALATION)
Qty: 0 | Refills: 0 | DISCHARGE

## 2022-07-20 RX ORDER — GABAPENTIN 400 MG/1
100 CAPSULE ORAL
Qty: 0 | Refills: 0 | DISCHARGE

## 2022-07-20 RX ORDER — LABETALOL HCL 100 MG
1 TABLET ORAL
Qty: 0 | Refills: 0 | DISCHARGE

## 2022-07-22 ENCOUNTER — APPOINTMENT (OUTPATIENT)
Dept: GASTROENTEROLOGY | Facility: CLINIC | Age: 57
End: 2022-07-22

## 2022-07-22 VITALS
TEMPERATURE: 96.4 F | DIASTOLIC BLOOD PRESSURE: 86 MMHG | HEART RATE: 91 BPM | BODY MASS INDEX: 38.61 KG/M2 | SYSTOLIC BLOOD PRESSURE: 154 MMHG | OXYGEN SATURATION: 97 % | WEIGHT: 246 LBS | HEIGHT: 67 IN

## 2022-07-22 DIAGNOSIS — Z86.010 PERSONAL HISTORY OF COLONIC POLYPS: ICD-10-CM

## 2022-07-22 DIAGNOSIS — D64.9 ANEMIA, UNSPECIFIED: ICD-10-CM

## 2022-07-22 DIAGNOSIS — A04.8 OTHER SPECIFIED BACTERIAL INTESTINAL INFECTIONS: ICD-10-CM

## 2022-07-22 DIAGNOSIS — K25.9 GASTRIC ULCER, UNSPECIFIED AS ACUTE OR CHRONIC, W/OUT HEMORRHAGE OR PERFORATION: ICD-10-CM

## 2022-07-22 PROCEDURE — 99213 OFFICE O/P EST LOW 20 MIN: CPT

## 2022-07-22 NOTE — HISTORY OF PRESENT ILLNESS
[de-identified] : 56F with pmhx of UGIB 2/2 PUD, HTN, DM, OA, GERD, H. pylori s/p tx presenting for follow-up. Pt reports recently had bloodwork done and also found to have carotid stenosis, referred to Hematology who recommended her to see her GI for further evaluation, possible anemia. Pt with prior hx of anemia but in setting of UGIB, recent EGD showed resolution of gastric ulcer and had tx for H. pylori (eradicated on repeat EGD biopsies). Also had colonoscopy with rectal polyp which was a TA. Denies any complaints today, no abd pain, n/v/d/c, melena, hematochezia, fever/chills, or other issues.

## 2022-07-22 NOTE — ASSESSMENT
[FreeTextEntry1] : 56F with pmhx of UGIB 2/2 PUD, HTN, DM, OA, GERD, H. pylori s/p tx presenting for follow-up. Pt reports recently had bloodwork done and also found to have carotid stenosis, referred to Hematology who recommended her to see her GI for further evaluation, possible anemia. Pt with prior hx of anemia but in setting of UGIB, recent EGD showed resolution of gastric ulcer and had tx for H. pylori (eradicated on repeat EGD biopsies). Also had colonoscopy with rectal polyp which was a TA. Denies any complaints today, no abd pain, n/v/d/c, melena, hematochezia, fever/chills, or other issues. \par - Anemic in past but in setting of UGIB which has resolved- will recheck CBC and iron studies today.\par - If persistent anemia or iron deficiency, will refer for capsule endoscopy. \par - For hx colon polyp, surveillance colon due in 2026.

## 2022-08-31 LAB
BASOPHILS # BLD AUTO: 0.06 K/UL
BASOPHILS NFR BLD AUTO: 0.5 %
EOSINOPHIL # BLD AUTO: 0.26 K/UL
EOSINOPHIL NFR BLD AUTO: 2.3 %
FERRITIN SERPL-MCNC: 49 NG/ML
HCT VFR BLD CALC: 42.5 %
HGB BLD-MCNC: 12.8 G/DL
IMM GRANULOCYTES NFR BLD AUTO: 0.4 %
IRON SATN MFR SERPL: 14 %
IRON SERPL-MCNC: 56 UG/DL
LYMPHOCYTES # BLD AUTO: 3.14 K/UL
LYMPHOCYTES NFR BLD AUTO: 27.7 %
MAN DIFF?: NORMAL
MCHC RBC-ENTMCNC: 30.1 GM/DL
MCHC RBC-ENTMCNC: 30.5 PG
MCV RBC AUTO: 101.2 FL
MONOCYTES # BLD AUTO: 0.83 K/UL
MONOCYTES NFR BLD AUTO: 7.3 %
NEUTROPHILS # BLD AUTO: 6.99 K/UL
NEUTROPHILS NFR BLD AUTO: 61.8 %
PLATELET # BLD AUTO: 482 K/UL
RBC # BLD: 4.2 M/UL
RBC # FLD: 16.6 %
TIBC SERPL-MCNC: 400 UG/DL
UIBC SERPL-MCNC: 344 UG/DL
WBC # FLD AUTO: 11.32 K/UL

## 2024-04-08 NOTE — ED ADULT NURSE NOTE - NSFALLRSKHARMRISK_ED_ALL_ED
Pt ambulated to triage with   Chief Complaint   Patient presents with    Shortness of Breath     Report that she didn't have nicotine for a day and reports now having runny nose, sneezing, and headache, and temp 105 at home and cough - clear sputum.  Reports pulse has been high.  Pt reports h/o POTS.      Sepsis score 3, Protocol ordered.  Pt reports that [facility] the [facility].  Pt Informed regarding triage process and verbalized understanding to inform triage tech or RN for any changes in condition. Placed in lobby.     
no

## 2024-05-30 NOTE — DISCHARGE NOTE PROVIDER - NSDCCPCAREPLAN_GEN_ALL_CORE_FT
[Dear  ___] : Dear  [unfilled], [Consult Letter:] : I had the pleasure of evaluating your patient, [unfilled]. [Please see my note below.] : Please see my note below. [Consult Closing:] : Thank you very much for allowing me to participate in the care of this patient.  If you have any questions, please do not hesitate to contact me. [Sincerely,] : Sincerely, [DrDebra  ___] : Dr. MASON [DrDebra ___] : Dr. MASON [FreeTextEntry2] : Emilio Carter MD [FreeTextEntry3] : Marek Bolaños MD Surgical Oncology [facility] Office: [phone] Cell: [phone] PRINCIPAL DISCHARGE DIAGNOSIS  Diagnosis: Upper GI bleed  Assessment and Plan of Treatment: You presented to the ED with bloody vomiting and dark colored stools. Your Hemoglbin was 7.1 on admission and 1 unit of blood (PRBC) was given and you were given IV fluids. You were taking Aspirin and you were given 1 unit of platelets. You were admitted to the hospital for a Gastrointestinal bleed. Your hemoglobin improved, however overnight on 8/23 your hemoglobin dropped to 6.7. You received an additional 2 units of PRBCs (blood) and your hemoglobin improved to 7.8. You underwent an Esophagogastroduodenoscopy (EGD, upper endoscopy) and it showed an ulcer in the stomach with a pulsating vessel. A clip was placed and cauterization was used to stop the bleeding. Repeat Hgb was stable at 7.3 and currently at 7.6. You can not take Aspirin or Meloxicam. Do not take any Non steroidal Anti inflammatory Agents (NSAIDS). You have to follow up in 2-3 months with Dr. Paco Gaviria to repeat upper endoscopy. You have to take protonix two times a day.      SECONDARY DISCHARGE DIAGNOSES  Diagnosis: Diabetes mellitus  Assessment and Plan of Treatment: Your HbA1C was 7.2. You have to continue your current home medications for your diabetes (metformin and glimeperide)    Diagnosis: Hypertension  Assessment and Plan of Treatment: Your blood pressure was normal during your hospitalization. You will resume your labetolol, however do not take your losartan-HCTZ until checking your blood pressure and following with your Primary care provider in 1-2 weeks.

## (undated) DEVICE — SENSOR O2 FINGER ADULT 24/CA

## (undated) DEVICE — BITE BLOCK MOUTHPCW/STRAP

## (undated) DEVICE — TUBING CANNULA SALTER LABS NASAL ADULT 7FT

## (undated) DEVICE — LUBE JELLY FOILPACK 36GM STERILE

## (undated) DEVICE — Device

## (undated) DEVICE — TUBING ENDO EXT OLYMPUS 160 24HR USE

## (undated) DEVICE — SYR 60CC G MANOM

## (undated) DEVICE — CATH ELCTR GLD PRB 10FR

## (undated) DEVICE — SYR LUER LOK 50CC

## (undated) DEVICE — MASK OXYGEN PANORAMIC

## (undated) DEVICE — CATH ELCTR GLIDE PRB 7FR

## (undated) DEVICE — SOL INJ NS 0.9% 500ML 1-PORT

## (undated) DEVICE — SNARE DISP

## (undated) DEVICE — CLAMP BX HOT RAD JAW 3

## (undated) DEVICE — BITE BLOCK SCOPE SAVER 20X27MM ADULT GREEN

## (undated) DEVICE — RETRIEVER ROTH NET PLATINUM-UNIVERSAL

## (undated) DEVICE — RADIAL JAW 4 240CM WITH NDL

## (undated) DEVICE — KIT ENDO PROCEDURE CUST 10/BX

## (undated) DEVICE — NDL INJ SCLERO INTERJECT 23G